# Patient Record
Sex: MALE | Race: WHITE | Employment: OTHER | URBAN - METROPOLITAN AREA
[De-identification: names, ages, dates, MRNs, and addresses within clinical notes are randomized per-mention and may not be internally consistent; named-entity substitution may affect disease eponyms.]

---

## 2023-12-23 ENCOUNTER — ANCILLARY PROCEDURE (OUTPATIENT)
Dept: EMERGENCY DEPT | Age: 73
DRG: 189 | End: 2023-12-23
Attending: EMERGENCY MEDICINE
Payer: MEDICARE

## 2023-12-23 ENCOUNTER — HOSPITAL ENCOUNTER (INPATIENT)
Age: 73
LOS: 4 days | Discharge: HOME | DRG: 189 | End: 2023-12-27
Attending: EMERGENCY MEDICINE | Admitting: INTERNAL MEDICINE
Payer: MEDICARE

## 2023-12-23 ENCOUNTER — APPOINTMENT (OUTPATIENT)
Dept: CT IMAGING | Age: 73
DRG: 189 | End: 2023-12-23
Payer: MEDICARE

## 2023-12-23 ENCOUNTER — APPOINTMENT (OUTPATIENT)
Dept: GENERAL RADIOLOGY | Age: 73
DRG: 189 | End: 2023-12-23
Payer: MEDICARE

## 2023-12-23 DIAGNOSIS — R06.89 DYSPNEA AND RESPIRATORY ABNORMALITIES: ICD-10-CM

## 2023-12-23 DIAGNOSIS — R06.00 DYSPNEA AND RESPIRATORY ABNORMALITIES: ICD-10-CM

## 2023-12-23 DIAGNOSIS — R09.02 HYPOXIA: Primary | ICD-10-CM

## 2023-12-23 DIAGNOSIS — E87.29 RESPIRATORY ACIDOSIS: ICD-10-CM

## 2023-12-23 PROBLEM — R73.9 HYPERGLYCEMIA: Status: ACTIVE | Noted: 2023-12-23

## 2023-12-23 PROBLEM — E87.1 HYPONATREMIA: Status: ACTIVE | Noted: 2023-12-23

## 2023-12-23 PROBLEM — J96.22 ACUTE ON CHRONIC RESPIRATORY FAILURE WITH HYPOXIA AND HYPERCAPNIA (HCC): Status: ACTIVE | Noted: 2023-12-23

## 2023-12-23 PROBLEM — J44.1 COPD EXACERBATION (HCC): Status: ACTIVE | Noted: 2023-12-23

## 2023-12-23 PROBLEM — J96.21 ACUTE ON CHRONIC RESPIRATORY FAILURE WITH HYPOXIA AND HYPERCAPNIA (HCC): Status: ACTIVE | Noted: 2023-12-23

## 2023-12-23 LAB
ALBUMIN SERPL-MCNC: 4.5 G/DL (ref 3.4–5)
ALBUMIN/GLOB SERPL: 1.4 {RATIO} (ref 1.1–2.2)
ALP SERPL-CCNC: 56 U/L (ref 40–129)
ALT SERPL-CCNC: 13 U/L (ref 10–40)
ANION GAP SERPL CALCULATED.3IONS-SCNC: 13 MMOL/L (ref 3–16)
AST SERPL-CCNC: 22 U/L (ref 15–37)
BASE EXCESS BLDA CALC-SCNC: -1.7 MMOL/L (ref -3–3)
BASE EXCESS BLDV CALC-SCNC: -4.4 MMOL/L (ref -3–3)
BASE EXCESS BLDV CALC-SCNC: -8.7 MMOL/L (ref -3–3)
BASOPHILS # BLD: 0 K/UL (ref 0–0.2)
BASOPHILS NFR BLD: 0.2 %
BILIRUB SERPL-MCNC: 0.7 MG/DL (ref 0–1)
BUN SERPL-MCNC: 8 MG/DL (ref 7–20)
CALCIUM SERPL-MCNC: 9.1 MG/DL (ref 8.3–10.6)
CHLORIDE SERPL-SCNC: 94 MMOL/L (ref 99–110)
CO2 BLDA-SCNC: 25.2 MMOL/L
CO2 BLDV-SCNC: 23 MMOL/L
CO2 BLDV-SCNC: 27 MMOL/L
CO2 SERPL-SCNC: 24 MMOL/L (ref 21–32)
COHGB MFR BLDA: 0.4 % (ref 0–1.5)
COHGB MFR BLDV: 1.1 % (ref 0–1.5)
COHGB MFR BLDV: 1.2 % (ref 0–1.5)
CREAT SERPL-MCNC: 0.7 MG/DL (ref 0.8–1.3)
DEPRECATED RDW RBC AUTO: 14.7 % (ref 12.4–15.4)
EKG ATRIAL RATE: 108 BPM
EKG DIAGNOSIS: NORMAL
EKG P AXIS: 95 DEGREES
EKG P-R INTERVAL: 152 MS
EKG Q-T INTERVAL: 324 MS
EKG QRS DURATION: 86 MS
EKG QTC CALCULATION (BAZETT): 434 MS
EKG R AXIS: 62 DEGREES
EKG T AXIS: 59 DEGREES
EKG VENTRICULAR RATE: 108 BPM
EOSINOPHIL # BLD: 0 K/UL (ref 0–0.6)
EOSINOPHIL NFR BLD: 0.1 %
FLUAV RNA RESP QL NAA+PROBE: NOT DETECTED
FLUBV RNA RESP QL NAA+PROBE: NOT DETECTED
GFR SERPLBLD CREATININE-BSD FMLA CKD-EPI: >60 ML/MIN/{1.73_M2}
GLUCOSE BLD-MCNC: 130 MG/DL (ref 70–99)
GLUCOSE BLD-MCNC: 133 MG/DL (ref 70–99)
GLUCOSE SERPL-MCNC: 202 MG/DL (ref 70–99)
HCO3 BLDA-SCNC: 23.9 MMOL/L (ref 21–29)
HCO3 BLDV-SCNC: 21.5 MMOL/L (ref 23–29)
HCO3 BLDV-SCNC: 25.1 MMOL/L (ref 23–29)
HCT VFR BLD AUTO: 41.9 % (ref 40.5–52.5)
HGB BLD-MCNC: 14.1 G/DL (ref 13.5–17.5)
HGB BLDA-MCNC: 14.8 G/DL (ref 13.5–17.5)
LYMPHOCYTES # BLD: 0.6 K/UL (ref 1–5.1)
LYMPHOCYTES NFR BLD: 3 %
MAGNESIUM SERPL-MCNC: 1.9 MG/DL (ref 1.8–2.4)
MCH RBC QN AUTO: 30.7 PG (ref 26–34)
MCHC RBC AUTO-ENTMCNC: 33.6 G/DL (ref 31–36)
MCV RBC AUTO: 91.4 FL (ref 80–100)
METHGB MFR BLDA: 0.1 %
METHGB MFR BLDV: 0.3 %
METHGB MFR BLDV: 0.3 %
MONOCYTES # BLD: 0.7 K/UL (ref 0–1.3)
MONOCYTES NFR BLD: 3.9 %
NEUTROPHILS # BLD: 17.1 K/UL (ref 1.7–7.7)
NEUTROPHILS NFR BLD: 92.8 %
NT-PROBNP SERPL-MCNC: 447 PG/ML (ref 0–124)
O2 CT VFR BLDV CALC: 16 VOL %
O2 CT VFR BLDV CALC: 18 VOL %
O2 THERAPY: ABNORMAL
O2 THERAPY: ABNORMAL
O2 THERAPY: NORMAL
PCO2 BLDA: 43.5 MMHG (ref 35–45)
PCO2 BLDV: 64.5 MMHG (ref 40–50)
PCO2 BLDV: 65.4 MMHG (ref 40–50)
PERFORMED ON: ABNORMAL
PERFORMED ON: ABNORMAL
PH BLDA: 7.36 [PH] (ref 7.35–7.45)
PH BLDV: 7.14 [PH] (ref 7.35–7.45)
PH BLDV: 7.2 [PH] (ref 7.35–7.45)
PLATELET # BLD AUTO: 326 K/UL (ref 135–450)
PMV BLD AUTO: 7.2 FL (ref 5–10.5)
PO2 BLDA: 106 MMHG (ref 75–108)
PO2 BLDV: 49.1 MMHG (ref 25–40)
PO2 BLDV: 58.6 MMHG (ref 25–40)
POTASSIUM SERPL-SCNC: 3.6 MMOL/L (ref 3.5–5.1)
PROCALCITONIN SERPL IA-MCNC: 0.13 NG/ML (ref 0–0.15)
PROT SERPL-MCNC: 7.7 G/DL (ref 6.4–8.2)
RBC # BLD AUTO: 4.58 M/UL (ref 4.2–5.9)
RSV AG NOSE QL: NEGATIVE
SAO2 % BLDA: 97.7 %
SAO2 % BLDV: 72 %
SAO2 % BLDV: 84 %
SARS-COV-2 RNA RESP QL NAA+PROBE: NOT DETECTED
SODIUM SERPL-SCNC: 131 MMOL/L (ref 136–145)
TROPONIN, HIGH SENSITIVITY: 26 NG/L (ref 0–22)
TROPONIN, HIGH SENSITIVITY: 34 NG/L (ref 0–22)
TROPONIN, HIGH SENSITIVITY: 35 NG/L (ref 0–22)
WBC # BLD AUTO: 18.4 K/UL (ref 4–11)

## 2023-12-23 PROCEDURE — 93308 TTE F-UP OR LMTD: CPT

## 2023-12-23 PROCEDURE — 94660 CPAP INITIATION&MGMT: CPT

## 2023-12-23 PROCEDURE — 2580000003 HC RX 258: Performed by: INTERNAL MEDICINE

## 2023-12-23 PROCEDURE — 93010 ELECTROCARDIOGRAM REPORT: CPT | Performed by: INTERNAL MEDICINE

## 2023-12-23 PROCEDURE — 6360000004 HC RX CONTRAST MEDICATION: Performed by: EMERGENCY MEDICINE

## 2023-12-23 PROCEDURE — 6360000002 HC RX W HCPCS: Performed by: NURSE PRACTITIONER

## 2023-12-23 PROCEDURE — 84145 PROCALCITONIN (PCT): CPT

## 2023-12-23 PROCEDURE — 83880 ASSAY OF NATRIURETIC PEPTIDE: CPT

## 2023-12-23 PROCEDURE — 80053 COMPREHEN METABOLIC PANEL: CPT

## 2023-12-23 PROCEDURE — 2580000003 HC RX 258: Performed by: NURSE PRACTITIONER

## 2023-12-23 PROCEDURE — 83735 ASSAY OF MAGNESIUM: CPT

## 2023-12-23 PROCEDURE — 99291 CRITICAL CARE FIRST HOUR: CPT | Performed by: INTERNAL MEDICINE

## 2023-12-23 PROCEDURE — 93005 ELECTROCARDIOGRAM TRACING: CPT | Performed by: EMERGENCY MEDICINE

## 2023-12-23 PROCEDURE — 87636 SARSCOV2 & INF A&B AMP PRB: CPT

## 2023-12-23 PROCEDURE — 71045 X-RAY EXAM CHEST 1 VIEW: CPT

## 2023-12-23 PROCEDURE — 2500000003 HC RX 250 WO HCPCS: Performed by: INTERNAL MEDICINE

## 2023-12-23 PROCEDURE — 5A09357 ASSISTANCE WITH RESPIRATORY VENTILATION, LESS THAN 24 CONSECUTIVE HOURS, CONTINUOUS POSITIVE AIRWAY PRESSURE: ICD-10-PCS | Performed by: INTERNAL MEDICINE

## 2023-12-23 PROCEDURE — 0202U NFCT DS 22 TRGT SARS-COV-2: CPT

## 2023-12-23 PROCEDURE — 2500000003 HC RX 250 WO HCPCS: Performed by: NURSE PRACTITIONER

## 2023-12-23 PROCEDURE — 99291 CRITICAL CARE FIRST HOUR: CPT

## 2023-12-23 PROCEDURE — 85025 COMPLETE CBC W/AUTO DIFF WBC: CPT

## 2023-12-23 PROCEDURE — 94640 AIRWAY INHALATION TREATMENT: CPT

## 2023-12-23 PROCEDURE — 94761 N-INVAS EAR/PLS OXIMETRY MLT: CPT

## 2023-12-23 PROCEDURE — 36415 COLL VENOUS BLD VENIPUNCTURE: CPT

## 2023-12-23 PROCEDURE — 2700000000 HC OXYGEN THERAPY PER DAY

## 2023-12-23 PROCEDURE — 83036 HEMOGLOBIN GLYCOSYLATED A1C: CPT

## 2023-12-23 PROCEDURE — 6360000002 HC RX W HCPCS: Performed by: INTERNAL MEDICINE

## 2023-12-23 PROCEDURE — 87807 RSV ASSAY W/OPTIC: CPT

## 2023-12-23 PROCEDURE — 71260 CT THORAX DX C+: CPT

## 2023-12-23 PROCEDURE — 84484 ASSAY OF TROPONIN QUANT: CPT

## 2023-12-23 PROCEDURE — 82803 BLOOD GASES ANY COMBINATION: CPT

## 2023-12-23 PROCEDURE — 99223 1ST HOSP IP/OBS HIGH 75: CPT | Performed by: INTERNAL MEDICINE

## 2023-12-23 PROCEDURE — 6370000000 HC RX 637 (ALT 250 FOR IP): Performed by: INTERNAL MEDICINE

## 2023-12-23 PROCEDURE — 6370000000 HC RX 637 (ALT 250 FOR IP): Performed by: EMERGENCY MEDICINE

## 2023-12-23 PROCEDURE — 2000000000 HC ICU R&B

## 2023-12-23 RX ORDER — SODIUM CHLORIDE 9 MG/ML
INJECTION, SOLUTION INTRAVENOUS PRN
Status: DISCONTINUED | OUTPATIENT
Start: 2023-12-23 | End: 2023-12-27 | Stop reason: HOSPADM

## 2023-12-23 RX ORDER — POTASSIUM CHLORIDE 7.45 MG/ML
10 INJECTION INTRAVENOUS PRN
Status: DISCONTINUED | OUTPATIENT
Start: 2023-12-23 | End: 2023-12-27 | Stop reason: HOSPADM

## 2023-12-23 RX ORDER — DEXMEDETOMIDINE HYDROCHLORIDE 4 UG/ML
.1-1.5 INJECTION, SOLUTION INTRAVENOUS CONTINUOUS
Status: DISCONTINUED | OUTPATIENT
Start: 2023-12-23 | End: 2023-12-24

## 2023-12-23 RX ORDER — INSULIN LISPRO 100 [IU]/ML
0-4 INJECTION, SOLUTION INTRAVENOUS; SUBCUTANEOUS NIGHTLY
Status: DISCONTINUED | OUTPATIENT
Start: 2023-12-23 | End: 2023-12-24

## 2023-12-23 RX ORDER — INSULIN LISPRO 100 [IU]/ML
0-4 INJECTION, SOLUTION INTRAVENOUS; SUBCUTANEOUS
Status: DISCONTINUED | OUTPATIENT
Start: 2023-12-23 | End: 2023-12-24

## 2023-12-23 RX ORDER — ACETAMINOPHEN 650 MG/1
650 SUPPOSITORY RECTAL EVERY 6 HOURS PRN
Status: DISCONTINUED | OUTPATIENT
Start: 2023-12-23 | End: 2023-12-27 | Stop reason: HOSPADM

## 2023-12-23 RX ORDER — POTASSIUM CHLORIDE 29.8 MG/ML
20 INJECTION INTRAVENOUS PRN
Status: DISCONTINUED | OUTPATIENT
Start: 2023-12-23 | End: 2023-12-23

## 2023-12-23 RX ORDER — ONDANSETRON 4 MG/1
4 TABLET, ORALLY DISINTEGRATING ORAL EVERY 8 HOURS PRN
Status: DISCONTINUED | OUTPATIENT
Start: 2023-12-23 | End: 2023-12-27 | Stop reason: HOSPADM

## 2023-12-23 RX ORDER — ENOXAPARIN SODIUM 100 MG/ML
40 INJECTION SUBCUTANEOUS DAILY
Status: DISCONTINUED | OUTPATIENT
Start: 2023-12-23 | End: 2023-12-23

## 2023-12-23 RX ORDER — ALBUTEROL SULFATE 2.5 MG/3ML
2.5 SOLUTION RESPIRATORY (INHALATION) EVERY 4 HOURS PRN
Status: DISCONTINUED | OUTPATIENT
Start: 2023-12-23 | End: 2023-12-27 | Stop reason: HOSPADM

## 2023-12-23 RX ORDER — ONDANSETRON 2 MG/ML
4 INJECTION INTRAMUSCULAR; INTRAVENOUS EVERY 6 HOURS PRN
Status: DISCONTINUED | OUTPATIENT
Start: 2023-12-23 | End: 2023-12-27 | Stop reason: HOSPADM

## 2023-12-23 RX ORDER — IPRATROPIUM BROMIDE AND ALBUTEROL SULFATE 2.5; .5 MG/3ML; MG/3ML
1 SOLUTION RESPIRATORY (INHALATION)
Status: DISCONTINUED | OUTPATIENT
Start: 2023-12-23 | End: 2023-12-27 | Stop reason: HOSPADM

## 2023-12-23 RX ORDER — MAGNESIUM SULFATE IN WATER 40 MG/ML
2000 INJECTION, SOLUTION INTRAVENOUS PRN
Status: DISCONTINUED | OUTPATIENT
Start: 2023-12-23 | End: 2023-12-27 | Stop reason: HOSPADM

## 2023-12-23 RX ORDER — SODIUM CHLORIDE 0.9 % (FLUSH) 0.9 %
5-40 SYRINGE (ML) INJECTION EVERY 12 HOURS SCHEDULED
Status: DISCONTINUED | OUTPATIENT
Start: 2023-12-23 | End: 2023-12-27 | Stop reason: HOSPADM

## 2023-12-23 RX ORDER — ACETAMINOPHEN 325 MG/1
650 TABLET ORAL EVERY 6 HOURS PRN
Status: DISCONTINUED | OUTPATIENT
Start: 2023-12-23 | End: 2023-12-27 | Stop reason: HOSPADM

## 2023-12-23 RX ORDER — IPRATROPIUM BROMIDE AND ALBUTEROL SULFATE 2.5; .5 MG/3ML; MG/3ML
1 SOLUTION RESPIRATORY (INHALATION)
Status: COMPLETED | OUTPATIENT
Start: 2023-12-23 | End: 2023-12-23

## 2023-12-23 RX ORDER — ENOXAPARIN SODIUM 100 MG/ML
40 INJECTION SUBCUTANEOUS NIGHTLY
Status: DISCONTINUED | OUTPATIENT
Start: 2023-12-23 | End: 2023-12-27 | Stop reason: HOSPADM

## 2023-12-23 RX ORDER — POLYETHYLENE GLYCOL 3350 17 G/17G
17 POWDER, FOR SOLUTION ORAL DAILY PRN
Status: DISCONTINUED | OUTPATIENT
Start: 2023-12-23 | End: 2023-12-27 | Stop reason: HOSPADM

## 2023-12-23 RX ORDER — NICOTINE 21 MG/24HR
1 PATCH, TRANSDERMAL 24 HOURS TRANSDERMAL DAILY
Status: DISCONTINUED | OUTPATIENT
Start: 2023-12-23 | End: 2023-12-23

## 2023-12-23 RX ORDER — SODIUM CHLORIDE 0.9 % (FLUSH) 0.9 %
5-40 SYRINGE (ML) INJECTION PRN
Status: DISCONTINUED | OUTPATIENT
Start: 2023-12-23 | End: 2023-12-27 | Stop reason: HOSPADM

## 2023-12-23 RX ADMIN — WATER 40 MG: 1 INJECTION INTRAMUSCULAR; INTRAVENOUS; SUBCUTANEOUS at 15:01

## 2023-12-23 RX ADMIN — ENOXAPARIN SODIUM 40 MG: 100 INJECTION SUBCUTANEOUS at 20:12

## 2023-12-23 RX ADMIN — DOXYCYCLINE 100 MG: 100 INJECTION, POWDER, LYOPHILIZED, FOR SOLUTION INTRAVENOUS at 15:22

## 2023-12-23 RX ADMIN — IPRATROPIUM BROMIDE AND ALBUTEROL SULFATE 1 DOSE: 2.5; .5 SOLUTION RESPIRATORY (INHALATION) at 15:17

## 2023-12-23 RX ADMIN — IPRATROPIUM BROMIDE AND ALBUTEROL SULFATE 1 DOSE: 2.5; .5 SOLUTION RESPIRATORY (INHALATION) at 09:43

## 2023-12-23 RX ADMIN — Medication 0.2 MCG/KG/HR: at 15:20

## 2023-12-23 RX ADMIN — SODIUM CHLORIDE, PRESERVATIVE FREE 10 ML: 5 INJECTION INTRAVENOUS at 20:33

## 2023-12-23 RX ADMIN — IPRATROPIUM BROMIDE AND ALBUTEROL SULFATE 1 DOSE: 2.5; .5 SOLUTION RESPIRATORY (INHALATION) at 20:16

## 2023-12-23 RX ADMIN — IOPAMIDOL 75 ML: 755 INJECTION, SOLUTION INTRAVENOUS at 11:17

## 2023-12-23 NOTE — CONSULTS
Reason for referral and CC: COPD, SOB, respiratory failure    HISTORY OF PRESENT ILLNESS: 70 yo male with a h/o tobacco abuse about 20 years ago and no known history of COPD presents with shortness of breath. He was found to be significantly hypoxic in the emergency room he was in respiratory distress. His CO2 was elevated and he was placed on BiPAP and admitted to the ICU. In ICU he is extremely anxious and was started on Precedex drip which is greatly helping him tolerate the BiPAP. CT ruled out PE and showed likely COPD. No past medical history on file. No past surgical history on file. Family History  family history is not on file. Social History:  has no history on file for tobacco use.   has no history on file for alcohol use. ALLERGIES:  Patient has No Known Allergies.   Continuous Infusions:   sodium chloride      dexmedetomidine HCl in NaCl 0.2 mcg/kg/hr (12/23/23 1520)     Scheduled Meds:   sodium chloride flush  5-40 mL IntraVENous 2 times per day    enoxaparin  40 mg SubCUTAneous Daily    doxycycline (VIBRAMYCIN) IV  100 mg IntraVENous 2 times per day    methylPREDNISolone  40 mg IntraVENous Daily    ipratropium 0.5 mg-albuterol 2.5 mg  1 Dose Inhalation 4x Daily RT    insulin lispro  0-4 Units SubCUTAneous TID WC    insulin lispro  0-4 Units SubCUTAneous Nightly     PRN Meds:  sodium chloride flush, sodium chloride, [DISCONTINUED] potassium chloride **OR** potassium chloride, magnesium sulfate, ondansetron **OR** ondansetron, polyethylene glycol, acetaminophen **OR** acetaminophen, perflutren lipid microspheres, albuterol    REVIEW OF SYSTEMS:  Constitutional: Negative for fever  HENT: Negative for sore throat  Eyes: Negative for redness   Respiratory:+ for dyspnea, cough  Cardiovascular: Negative for chest pain  Gastrointestinal: Negative for vomiting, diarrhea   Genitourinary: Negative for hematuria   Musculoskeletal: Negative for arthralgias   Skin: Negative for rash  Neurological:

## 2023-12-23 NOTE — ED NOTES
1235- Call to pulmonology for consult. Dr. Pena Fore to callback. 18- Dr. Pena Fore with callback and connected to Dr. Homa Llanos.

## 2023-12-24 LAB
ANION GAP SERPL CALCULATED.3IONS-SCNC: 10 MMOL/L (ref 3–16)
BASE EXCESS BLDV CALC-SCNC: 2.3 MMOL/L (ref -3–3)
BASOPHILS # BLD: 0 K/UL (ref 0–0.2)
BASOPHILS NFR BLD: 0.1 %
BUN SERPL-MCNC: 12 MG/DL (ref 7–20)
CALCIUM SERPL-MCNC: 9.5 MG/DL (ref 8.3–10.6)
CHLORIDE SERPL-SCNC: 101 MMOL/L (ref 99–110)
CO2 BLDV-SCNC: 29 MMOL/L
CO2 SERPL-SCNC: 24 MMOL/L (ref 21–32)
COHGB MFR BLDV: 1.8 % (ref 0–1.5)
CREAT SERPL-MCNC: 0.7 MG/DL (ref 0.8–1.3)
DEPRECATED RDW RBC AUTO: 14.7 % (ref 12.4–15.4)
EOSINOPHIL # BLD: 0.1 K/UL (ref 0–0.6)
EOSINOPHIL NFR BLD: 0.4 %
EST. AVERAGE GLUCOSE BLD GHB EST-MCNC: 96.8 MG/DL
GFR SERPLBLD CREATININE-BSD FMLA CKD-EPI: >60 ML/MIN/{1.73_M2}
GLUCOSE BLD-MCNC: 114 MG/DL (ref 70–99)
GLUCOSE BLD-MCNC: 97 MG/DL (ref 70–99)
GLUCOSE SERPL-MCNC: 108 MG/DL (ref 70–99)
HBA1C MFR BLD: 5 %
HCO3 BLDV-SCNC: 27.4 MMOL/L (ref 23–29)
HCT VFR BLD AUTO: 40.3 % (ref 40.5–52.5)
HGB BLD-MCNC: 13.4 G/DL (ref 13.5–17.5)
LYMPHOCYTES # BLD: 0.7 K/UL (ref 1–5.1)
LYMPHOCYTES NFR BLD: 4.3 %
MCH RBC QN AUTO: 30.1 PG (ref 26–34)
MCHC RBC AUTO-ENTMCNC: 33.3 G/DL (ref 31–36)
MCV RBC AUTO: 90.4 FL (ref 80–100)
METHGB MFR BLDV: 0.3 %
MONOCYTES # BLD: 1.4 K/UL (ref 0–1.3)
MONOCYTES NFR BLD: 8.2 %
NEUTROPHILS # BLD: 15.1 K/UL (ref 1.7–7.7)
NEUTROPHILS NFR BLD: 87 %
O2 CT VFR BLDV CALC: 12 VOL %
O2 THERAPY: ABNORMAL
PCO2 BLDV: 44.6 MMHG (ref 40–50)
PERFORMED ON: ABNORMAL
PERFORMED ON: NORMAL
PH BLDV: 7.41 [PH] (ref 7.35–7.45)
PLATELET # BLD AUTO: 289 K/UL (ref 135–450)
PMV BLD AUTO: 7.6 FL (ref 5–10.5)
PO2 BLDV: 27.9 MMHG (ref 25–40)
POTASSIUM SERPL-SCNC: 4.5 MMOL/L (ref 3.5–5.1)
RBC # BLD AUTO: 4.45 M/UL (ref 4.2–5.9)
REPORT: NORMAL
RESP PATH DNA+RNA PNL NPH NAA+NON-PROBE: NORMAL
SAO2 % BLDV: 52 %
SODIUM SERPL-SCNC: 135 MMOL/L (ref 136–145)
WBC # BLD AUTO: 17.3 K/UL (ref 4–11)

## 2023-12-24 PROCEDURE — 99291 CRITICAL CARE FIRST HOUR: CPT | Performed by: INTERNAL MEDICINE

## 2023-12-24 PROCEDURE — 99233 SBSQ HOSP IP/OBS HIGH 50: CPT | Performed by: INTERNAL MEDICINE

## 2023-12-24 PROCEDURE — 2580000003 HC RX 258: Performed by: INTERNAL MEDICINE

## 2023-12-24 PROCEDURE — 6370000000 HC RX 637 (ALT 250 FOR IP): Performed by: INTERNAL MEDICINE

## 2023-12-24 PROCEDURE — 94761 N-INVAS EAR/PLS OXIMETRY MLT: CPT

## 2023-12-24 PROCEDURE — 2580000003 HC RX 258: Performed by: NURSE PRACTITIONER

## 2023-12-24 PROCEDURE — 2500000003 HC RX 250 WO HCPCS: Performed by: NURSE PRACTITIONER

## 2023-12-24 PROCEDURE — 80048 BASIC METABOLIC PNL TOTAL CA: CPT

## 2023-12-24 PROCEDURE — 94640 AIRWAY INHALATION TREATMENT: CPT

## 2023-12-24 PROCEDURE — 94660 CPAP INITIATION&MGMT: CPT

## 2023-12-24 PROCEDURE — 85025 COMPLETE CBC W/AUTO DIFF WBC: CPT

## 2023-12-24 PROCEDURE — 6360000002 HC RX W HCPCS: Performed by: INTERNAL MEDICINE

## 2023-12-24 PROCEDURE — 36415 COLL VENOUS BLD VENIPUNCTURE: CPT

## 2023-12-24 PROCEDURE — 2700000000 HC OXYGEN THERAPY PER DAY

## 2023-12-24 PROCEDURE — 1200000000 HC SEMI PRIVATE

## 2023-12-24 PROCEDURE — 82803 BLOOD GASES ANY COMBINATION: CPT

## 2023-12-24 RX ADMIN — IPRATROPIUM BROMIDE AND ALBUTEROL SULFATE 1 DOSE: 2.5; .5 SOLUTION RESPIRATORY (INHALATION) at 07:35

## 2023-12-24 RX ADMIN — DOXYCYCLINE 100 MG: 100 INJECTION, POWDER, LYOPHILIZED, FOR SOLUTION INTRAVENOUS at 01:09

## 2023-12-24 RX ADMIN — DOXYCYCLINE 100 MG: 100 INJECTION, POWDER, LYOPHILIZED, FOR SOLUTION INTRAVENOUS at 14:15

## 2023-12-24 RX ADMIN — IPRATROPIUM BROMIDE AND ALBUTEROL SULFATE 1 DOSE: 2.5; .5 SOLUTION RESPIRATORY (INHALATION) at 19:49

## 2023-12-24 RX ADMIN — IPRATROPIUM BROMIDE AND ALBUTEROL SULFATE 1 DOSE: 2.5; .5 SOLUTION RESPIRATORY (INHALATION) at 11:07

## 2023-12-24 RX ADMIN — SODIUM CHLORIDE, PRESERVATIVE FREE 10 ML: 5 INJECTION INTRAVENOUS at 08:17

## 2023-12-24 RX ADMIN — SODIUM CHLORIDE, PRESERVATIVE FREE 10 ML: 5 INJECTION INTRAVENOUS at 20:05

## 2023-12-24 RX ADMIN — WATER 40 MG: 1 INJECTION INTRAMUSCULAR; INTRAVENOUS; SUBCUTANEOUS at 08:18

## 2023-12-24 RX ADMIN — ENOXAPARIN SODIUM 40 MG: 100 INJECTION SUBCUTANEOUS at 20:04

## 2023-12-24 NOTE — ACP (ADVANCE CARE PLANNING)
Advance Care Planning     Advance Care Planning Inpatient Note  [facility]    Today's Date: 12/24/2023  Unit: [facility] ICU    Received request from IDT Member. Upon review of chart and communication with care team, patient's decision making abilities are not in question. . Patient and Child/Children was/were present in the room during visit. Goals of ACP Conversation:  Discuss advance care planning documents    Health Care Decision Makers:       Primary Decision Maker: Toya Stover - [phone]  Summary:  Verified Healthcare Decision [facility]    Advance Care Planning Documents (Patient Wishes):  None     Assessment:  Patient short of breath with conversation. Son at bedside states he thinks his mother, Kaylee Spurling, wanted information of health care power of . Interventions:  Provided education on documents for clarity and greater understanding  Discussed and provided education on state decision maker hierarchy    Care Preferences Communicated:   No    Outcomes/Plan:  ACP Discussion: If patient wants to revisit documents while here, chaplains will be available. Patient given information/documents and contact numbers.  informed patient and son that patient is the only one that can complete documents as you can't complete them for someone else.   We reviewed that his spouse would be the legal medical decision maker if patient becomes unable to make medical decisions, followed by his three adult children    Electronically signed by Caro Munguia [facility] on 12/24/2023 at 10:32 AM

## 2023-12-24 NOTE — CARE COORDINATION
Case Management Assessment  Initial Evaluation    Date/Time of Evaluation: 12/24/2023 4:13 PM  Assessment Completed by: Radha Lyons    If patient is discharged prior to next notation, then this note serves as note for discharge by case management. Patient Name: Weston Child                   YOB: 1950  Diagnosis: Respiratory acidosis [E87.29]  Dyspnea and respiratory abnormalities [R06.00, R06.89]  Hypoxia [R09.02]  Acute on chronic respiratory failure with hypoxia and hypercapnia ([address]) [J96.21, J96.22]                   Date / Time: 12/23/2023  9:24 AM    Patient Admission Status: Inpatient   Readmission Risk (Low < 19, Mod (19-27), High > 27): Readmission Risk Score: 9.1    Current PCP: No primary care provider on file. PCP verified by CM? Yes (no PCP)    Chart Reviewed: Yes      History Provided by: Significant Other  Patient Orientation: Alert and Oriented    Patient Cognition: Alert    Hospitalization in the last 30 days (Readmission):  No    If yes, Readmission Assessment in  Navigator will be completed. Advance Directives:      Code Status: Full Code   Patient's Primary Decision Maker is: Legal Next of Kin    Primary Decision MakeAnibal Hallman Spouse - [phone]    Discharge Planning:    Patient lives with: Spouse/Significant Other Type of Home: House  Primary Care Giver: Self  Patient Support Systems include: Spouse/Significant Other   Current Financial resources: Medicare  Current community resources: None  Current services prior to admission: None            Current DME:              Type of Home Care services:  [place]    ADLS  Prior functional level: Independent in ADLs/IADLs  Current functional level: Independent in ADLs/IADLs    PT AM-PAC:   /24  OT AM-PAC:   /24    Family can provide assistance at DC: Yes  Would you like Case Management to discuss the discharge plan with any other family members/significant others, and if so, who?  Yes (wife)  Plans to Return to Present

## 2023-12-24 NOTE — PLAN OF CARE
Problem: Discharge Planning  Goal: Discharge to home or other facility with appropriate resources  Outcome: Progressing     Problem: Pain  Goal: Verbalizes/displays adequate comfort level or baseline comfort level  Outcome: Progressing  Flowsheets (Taken 12/24/2023 1600)  Verbalizes/displays adequate comfort level or baseline comfort level:   Encourage patient to monitor pain and request assistance   Assess pain using appropriate pain scale   Administer analgesics based on type and severity of pain and evaluate response   Implement non-pharmacological measures as appropriate and evaluate response   Consider cultural and social influences on pain and pain management   Notify Licensed Independent Practitioner if interventions unsuccessful or patient reports new pain     Problem: Skin/Tissue Integrity  Goal: Absence of new skin breakdown  Outcome: Progressing     Problem: ABCDS Injury Assessment  Goal: Absence of physical injury  Outcome: Progressing     Problem: Safety - Adult  Goal: Free from fall injury  Outcome: Progressing     Problem: Chronic Conditions and Co-morbidities  Goal: Patient's chronic conditions and co-morbidity symptoms are monitored and maintained or improved  Outcome: Progressing

## 2023-12-25 LAB
ANION GAP SERPL CALCULATED.3IONS-SCNC: 5 MMOL/L (ref 3–16)
ANISOCYTOSIS BLD QL SMEAR: ABNORMAL
BASOPHILS # BLD: 0 K/UL (ref 0–0.2)
BASOPHILS NFR BLD: 0 %
BUN SERPL-MCNC: 17 MG/DL (ref 7–20)
CALCIUM SERPL-MCNC: 8.8 MG/DL (ref 8.3–10.6)
CHLORIDE SERPL-SCNC: 103 MMOL/L (ref 99–110)
CO2 SERPL-SCNC: 28 MMOL/L (ref 21–32)
CREAT SERPL-MCNC: 0.8 MG/DL (ref 0.8–1.3)
DEPRECATED RDW RBC AUTO: 14.9 % (ref 12.4–15.4)
EOSINOPHIL # BLD: 0.3 K/UL (ref 0–0.6)
EOSINOPHIL NFR BLD: 2 %
GFR SERPLBLD CREATININE-BSD FMLA CKD-EPI: >60 ML/MIN/{1.73_M2}
GLUCOSE SERPL-MCNC: 100 MG/DL (ref 70–99)
HCT VFR BLD AUTO: 37.9 % (ref 40.5–52.5)
HGB BLD-MCNC: 12.9 G/DL (ref 13.5–17.5)
LYMPHOCYTES # BLD: 1.2 K/UL (ref 1–5.1)
LYMPHOCYTES NFR BLD: 8 %
MCH RBC QN AUTO: 30.3 PG (ref 26–34)
MCHC RBC AUTO-ENTMCNC: 34 G/DL (ref 31–36)
MCV RBC AUTO: 89.2 FL (ref 80–100)
MONOCYTES # BLD: 0.8 K/UL (ref 0–1.3)
MONOCYTES NFR BLD: 5 %
NEUTROPHILS # BLD: 12.8 K/UL (ref 1.7–7.7)
NEUTROPHILS NFR BLD: 85 %
NEUTS HYPERSEG BLD QL SMEAR: PRESENT
PATH INTERP BLD-IMP: YES
PLATELET # BLD AUTO: 278 K/UL (ref 135–450)
PLATELET BLD QL SMEAR: ADEQUATE
PMV BLD AUTO: 7.4 FL (ref 5–10.5)
POIKILOCYTOSIS BLD QL SMEAR: ABNORMAL
POTASSIUM SERPL-SCNC: 3.9 MMOL/L (ref 3.5–5.1)
RBC # BLD AUTO: 4.25 M/UL (ref 4.2–5.9)
SLIDE REVIEW: ABNORMAL
SODIUM SERPL-SCNC: 136 MMOL/L (ref 136–145)
WBC # BLD AUTO: 15.1 K/UL (ref 4–11)

## 2023-12-25 PROCEDURE — 2700000000 HC OXYGEN THERAPY PER DAY

## 2023-12-25 PROCEDURE — 99233 SBSQ HOSP IP/OBS HIGH 50: CPT | Performed by: INTERNAL MEDICINE

## 2023-12-25 PROCEDURE — 80048 BASIC METABOLIC PNL TOTAL CA: CPT

## 2023-12-25 PROCEDURE — 2500000003 HC RX 250 WO HCPCS: Performed by: INTERNAL MEDICINE

## 2023-12-25 PROCEDURE — 1200000000 HC SEMI PRIVATE

## 2023-12-25 PROCEDURE — 94640 AIRWAY INHALATION TREATMENT: CPT

## 2023-12-25 PROCEDURE — 85025 COMPLETE CBC W/AUTO DIFF WBC: CPT

## 2023-12-25 PROCEDURE — 99232 SBSQ HOSP IP/OBS MODERATE 35: CPT | Performed by: INTERNAL MEDICINE

## 2023-12-25 PROCEDURE — 94761 N-INVAS EAR/PLS OXIMETRY MLT: CPT

## 2023-12-25 PROCEDURE — 6370000000 HC RX 637 (ALT 250 FOR IP): Performed by: INTERNAL MEDICINE

## 2023-12-25 PROCEDURE — 36415 COLL VENOUS BLD VENIPUNCTURE: CPT

## 2023-12-25 PROCEDURE — 2580000003 HC RX 258: Performed by: INTERNAL MEDICINE

## 2023-12-25 PROCEDURE — 6360000002 HC RX W HCPCS: Performed by: INTERNAL MEDICINE

## 2023-12-25 RX ORDER — PREDNISONE 20 MG/1
40 TABLET ORAL DAILY
Status: DISCONTINUED | OUTPATIENT
Start: 2023-12-26 | End: 2023-12-27 | Stop reason: HOSPADM

## 2023-12-25 RX ADMIN — SODIUM CHLORIDE: 9 INJECTION, SOLUTION INTRAVENOUS at 02:43

## 2023-12-25 RX ADMIN — SODIUM CHLORIDE, PRESERVATIVE FREE 10 ML: 5 INJECTION INTRAVENOUS at 10:13

## 2023-12-25 RX ADMIN — WATER 40 MG: 1 INJECTION INTRAMUSCULAR; INTRAVENOUS; SUBCUTANEOUS at 10:14

## 2023-12-25 RX ADMIN — ENOXAPARIN SODIUM 40 MG: 100 INJECTION SUBCUTANEOUS at 20:57

## 2023-12-25 RX ADMIN — IPRATROPIUM BROMIDE AND ALBUTEROL SULFATE 1 DOSE: 2.5; .5 SOLUTION RESPIRATORY (INHALATION) at 07:48

## 2023-12-25 RX ADMIN — IPRATROPIUM BROMIDE AND ALBUTEROL SULFATE 1 DOSE: 2.5; .5 SOLUTION RESPIRATORY (INHALATION) at 15:36

## 2023-12-25 RX ADMIN — IPRATROPIUM BROMIDE AND ALBUTEROL SULFATE 1 DOSE: 2.5; .5 SOLUTION RESPIRATORY (INHALATION) at 11:08

## 2023-12-25 RX ADMIN — IPRATROPIUM BROMIDE AND ALBUTEROL SULFATE 1 DOSE: 2.5; .5 SOLUTION RESPIRATORY (INHALATION) at 19:45

## 2023-12-25 RX ADMIN — DOXYCYCLINE 100 MG: 100 INJECTION, POWDER, LYOPHILIZED, FOR SOLUTION INTRAVENOUS at 14:09

## 2023-12-25 RX ADMIN — SODIUM CHLORIDE, PRESERVATIVE FREE 10 ML: 5 INJECTION INTRAVENOUS at 20:59

## 2023-12-25 RX ADMIN — DOXYCYCLINE 100 MG: 100 INJECTION, POWDER, LYOPHILIZED, FOR SOLUTION INTRAVENOUS at 02:45

## 2023-12-25 NOTE — PLAN OF CARE
Problem: Discharge Planning  Goal: Discharge to home or other facility with appropriate resources  40/09/5935 7327 by LUIS FERNANDO VALLE  Outcome: Progressing  12/24/2023 1749 by Homero Gruber RN  Outcome: Progressing     Problem: Pain  Goal: Verbalizes/displays adequate comfort level or baseline comfort level  88/71/1308 8199 by LUIS FERNANDO VALLE  Outcome: Progressing  12/24/2023 1749 by Homero Gruber RN  Outcome: Progressing  Flowsheets (Taken 12/24/2023 1600)  Verbalizes/displays adequate comfort level or baseline comfort level:   Encourage patient to monitor pain and request assistance   Assess pain using appropriate pain scale   Administer analgesics based on type and severity of pain and evaluate response   Implement non-pharmacological measures as appropriate and evaluate response   Consider cultural and social influences on pain and pain management   Notify Licensed Independent Practitioner if interventions unsuccessful or patient reports new pain     Problem: Skin/Tissue Integrity  Goal: Absence of new skin breakdown  Description: 1. Monitor for areas of redness and/or skin breakdown  2. Assess vascular access sites hourly  3. Every 4-6 hours minimum:  Change oxygen saturation probe site  4. Every 4-6 hours:  If on nasal continuous positive airway pressure, respiratory therapy assess nares and determine need for appliance change or resting period.   08/52/6682 1546 by LUIS FERNANDO VALLE  Outcome: Progressing  12/24/2023 1749 by Homero Gruber RN  Outcome: Progressing     Problem: ABCDS Injury Assessment  Goal: Absence of physical injury  32/09/7690 8440 by LUIS FERNANDO VALLE  Outcome: Progressing  12/24/2023 1749 by Homero Gruber RN  Outcome: Progressing     Problem: Safety - Adult  Goal: Free from fall injury  92/64/5787 9417 by LUIS FERNANDO VALLE  Outcome: Progressing  12/24/2023 1749 by Homero Gruber RN  Outcome: Progressing     Problem: Chronic Conditions and Co-morbidities  Goal:

## 2023-12-25 NOTE — PLAN OF CARE
Problem: Discharge Planning  Goal: Discharge to home or other facility with appropriate resources  12/25/2023 1249 by Carley Brady RN  Outcome: Progressing  95/06/5932 9154 by LUIS FERNANDO VALLE  Outcome: Progressing     Problem: Pain  Goal: Verbalizes/displays adequate comfort level or baseline comfort level  12/25/2023 1249 by Carley Brady RN  Outcome: Progressing  10/70/9652 0681 by LUIS FERNANDO VALLE  Outcome: Progressing     Problem: Skin/Tissue Integrity  Goal: Absence of new skin breakdown  Description: 1. Monitor for areas of redness and/or skin breakdown  2. Assess vascular access sites hourly  3. Every 4-6 hours minimum:  Change oxygen saturation probe site  4. Every 4-6 hours:  If on nasal continuous positive airway pressure, respiratory therapy assess nares and determine need for appliance change or resting period.   12/25/2023 1249 by Carley Brady RN  Outcome: Progressing  93/30/3309 6565 by LUIS FERNANDO VALLE  Outcome: Progressing     Problem: ABCDS Injury Assessment  Goal: Absence of physical injury  12/25/2023 1249 by Carley Brady RN  Outcome: Progressing  29/22/7074 1012 by LUIS FERNANDO VALLE  Outcome: Progressing     Problem: Safety - Adult  Goal: Free from fall injury  55/70/4755 7983 by LUIS FERNANDO VALLE  Outcome: Progressing     Problem: Chronic Conditions and Co-morbidities  Goal: Patient's chronic conditions and co-morbidity symptoms are monitored and maintained or improved  84/22/5197 4519 by LUIS FERNANDO VALLE  Outcome: Progressing

## 2023-12-25 NOTE — PLAN OF CARE
Problem: Discharge Planning  Goal: Discharge to home or other facility with appropriate resources  12/25/2023 1249 by Maryse Youssef RN  Outcome: Progressing  12/25/2023 1249 by Maryse Youssef RN  Outcome: Progressing  81/94/9222 4178 by LUIS FERNANDO VALLE  Outcome: Progressing     Problem: Pain  Goal: Verbalizes/displays adequate comfort level or baseline comfort level  12/25/2023 1249 by Maryse Youssef RN  Outcome: Progressing  12/25/2023 1249 by Maryse Youssef RN  Outcome: Progressing  27/65/0604 5691 by LUIS FERNANDO VALLE  Outcome: Progressing     Problem: Skin/Tissue Integrity  Goal: Absence of new skin breakdown  Description: 1. Monitor for areas of redness and/or skin breakdown  2. Assess vascular access sites hourly  3. Every 4-6 hours minimum:  Change oxygen saturation probe site  4. Every 4-6 hours:  If on nasal continuous positive airway pressure, respiratory therapy assess nares and determine need for appliance change or resting period.   12/25/2023 1249 by Maryse Youssef RN  Outcome: Progressing  12/25/2023 1249 by Maryse Youssef RN  Outcome: Progressing  59/91/0175 0668 by LUIS FERNANDO VALLE  Outcome: Progressing     Problem: ABCDS Injury Assessment  Goal: Absence of physical injury  12/25/2023 1249 by Maryse Youssef RN  Outcome: Progressing  57/43/5942 8071 by LUIS FERNANDO VALLE  Outcome: Progressing     Problem: Safety - Adult  Goal: Free from fall injury  12/25/2023 1249 by Maryse Youssef RN  Outcome: Progressing  32/29/1835 8867 by LUIS FERNANDO VALLE  Outcome: Progressing     Problem: Chronic Conditions and Co-morbidities  Goal: Patient's chronic conditions and co-morbidity symptoms are monitored and maintained or improved  12/25/2023 1249 by Maryse Youssef RN  Outcome: Progressing  65/14/6984 2601 by LUIS FERNANDO VALLE  Outcome: Progressing

## 2023-12-26 PROBLEM — R09.02 HYPOXIA: Status: ACTIVE | Noted: 2023-12-26

## 2023-12-26 PROBLEM — E87.29 RESPIRATORY ACIDOSIS: Status: ACTIVE | Noted: 2023-12-26

## 2023-12-26 LAB
ANION GAP SERPL CALCULATED.3IONS-SCNC: 8 MMOL/L (ref 3–16)
BASOPHILS # BLD: 0 K/UL (ref 0–0.2)
BASOPHILS NFR BLD: 0.2 %
BUN SERPL-MCNC: 16 MG/DL (ref 7–20)
CALCIUM SERPL-MCNC: 8.5 MG/DL (ref 8.3–10.6)
CHLORIDE SERPL-SCNC: 104 MMOL/L (ref 99–110)
CO2 SERPL-SCNC: 26 MMOL/L (ref 21–32)
CREAT SERPL-MCNC: 0.7 MG/DL (ref 0.8–1.3)
DEPRECATED RDW RBC AUTO: 14.6 % (ref 12.4–15.4)
EOSINOPHIL # BLD: 0.1 K/UL (ref 0–0.6)
EOSINOPHIL NFR BLD: 0.8 %
GFR SERPLBLD CREATININE-BSD FMLA CKD-EPI: >60 ML/MIN/{1.73_M2}
GLUCOSE SERPL-MCNC: 101 MG/DL (ref 70–99)
HCT VFR BLD AUTO: 36.3 % (ref 40.5–52.5)
HGB BLD-MCNC: 12.4 G/DL (ref 13.5–17.5)
LYMPHOCYTES # BLD: 2.7 K/UL (ref 1–5.1)
LYMPHOCYTES NFR BLD: 20.8 %
MCH RBC QN AUTO: 30.3 PG (ref 26–34)
MCHC RBC AUTO-ENTMCNC: 34.1 G/DL (ref 31–36)
MCV RBC AUTO: 88.9 FL (ref 80–100)
MONOCYTES # BLD: 1.4 K/UL (ref 0–1.3)
MONOCYTES NFR BLD: 10.6 %
NEUTROPHILS # BLD: 8.8 K/UL (ref 1.7–7.7)
NEUTROPHILS NFR BLD: 67.6 %
PATH INTERP BLD-IMP: NORMAL
PLATELET # BLD AUTO: 279 K/UL (ref 135–450)
PMV BLD AUTO: 7.6 FL (ref 5–10.5)
POTASSIUM SERPL-SCNC: 3.9 MMOL/L (ref 3.5–5.1)
RBC # BLD AUTO: 4.08 M/UL (ref 4.2–5.9)
SODIUM SERPL-SCNC: 138 MMOL/L (ref 136–145)
WBC # BLD AUTO: 13.1 K/UL (ref 4–11)

## 2023-12-26 PROCEDURE — 6370000000 HC RX 637 (ALT 250 FOR IP): Performed by: INTERNAL MEDICINE

## 2023-12-26 PROCEDURE — 85025 COMPLETE CBC W/AUTO DIFF WBC: CPT

## 2023-12-26 PROCEDURE — 94010 BREATHING CAPACITY TEST: CPT

## 2023-12-26 PROCEDURE — 99232 SBSQ HOSP IP/OBS MODERATE 35: CPT

## 2023-12-26 PROCEDURE — 99232 SBSQ HOSP IP/OBS MODERATE 35: CPT | Performed by: INTERNAL MEDICINE

## 2023-12-26 PROCEDURE — 80048 BASIC METABOLIC PNL TOTAL CA: CPT

## 2023-12-26 PROCEDURE — 94761 N-INVAS EAR/PLS OXIMETRY MLT: CPT

## 2023-12-26 PROCEDURE — 93306 TTE W/DOPPLER COMPLETE: CPT

## 2023-12-26 PROCEDURE — 1200000000 HC SEMI PRIVATE

## 2023-12-26 PROCEDURE — 94640 AIRWAY INHALATION TREATMENT: CPT

## 2023-12-26 PROCEDURE — 2580000003 HC RX 258: Performed by: INTERNAL MEDICINE

## 2023-12-26 PROCEDURE — 2500000003 HC RX 250 WO HCPCS: Performed by: INTERNAL MEDICINE

## 2023-12-26 PROCEDURE — 36415 COLL VENOUS BLD VENIPUNCTURE: CPT

## 2023-12-26 PROCEDURE — 6360000002 HC RX W HCPCS: Performed by: INTERNAL MEDICINE

## 2023-12-26 RX ORDER — ALBUTEROL SULFATE 90 UG/1
2 AEROSOL, METERED RESPIRATORY (INHALATION) EVERY 4 HOURS PRN
Qty: 18 G | Refills: 0 | Status: SHIPPED | OUTPATIENT
Start: 2023-12-26

## 2023-12-26 RX ORDER — PREDNISONE 10 MG/1
TABLET ORAL
Qty: 14 TABLET | Refills: 0 | Status: SHIPPED | OUTPATIENT
Start: 2023-12-27 | End: 2024-01-02

## 2023-12-26 RX ORDER — DOXYCYCLINE HYCLATE 100 MG
100 TABLET ORAL 2 TIMES DAILY
Qty: 8 TABLET | Refills: 0 | Status: SHIPPED | OUTPATIENT
Start: 2023-12-26 | End: 2023-12-30

## 2023-12-26 RX ORDER — TIOTROPIUM BROMIDE INHALATION SPRAY 1.56 UG/1
2 SPRAY, METERED RESPIRATORY (INHALATION) DAILY
Qty: 1 EACH | Refills: 0 | Status: SHIPPED | OUTPATIENT
Start: 2023-12-26

## 2023-12-26 RX ADMIN — ENOXAPARIN SODIUM 40 MG: 100 INJECTION SUBCUTANEOUS at 21:32

## 2023-12-26 RX ADMIN — DOXYCYCLINE 100 MG: 100 INJECTION, POWDER, LYOPHILIZED, FOR SOLUTION INTRAVENOUS at 16:23

## 2023-12-26 RX ADMIN — IPRATROPIUM BROMIDE AND ALBUTEROL SULFATE 1 DOSE: 2.5; .5 SOLUTION RESPIRATORY (INHALATION) at 11:32

## 2023-12-26 RX ADMIN — IPRATROPIUM BROMIDE AND ALBUTEROL SULFATE 1 DOSE: 2.5; .5 SOLUTION RESPIRATORY (INHALATION) at 08:05

## 2023-12-26 RX ADMIN — SODIUM CHLORIDE, PRESERVATIVE FREE 10 ML: 5 INJECTION INTRAVENOUS at 21:33

## 2023-12-26 RX ADMIN — SODIUM CHLORIDE, PRESERVATIVE FREE 10 ML: 5 INJECTION INTRAVENOUS at 08:49

## 2023-12-26 RX ADMIN — DOXYCYCLINE 100 MG: 100 INJECTION, POWDER, LYOPHILIZED, FOR SOLUTION INTRAVENOUS at 02:03

## 2023-12-26 RX ADMIN — IPRATROPIUM BROMIDE AND ALBUTEROL SULFATE 1 DOSE: 2.5; .5 SOLUTION RESPIRATORY (INHALATION) at 19:38

## 2023-12-26 RX ADMIN — IPRATROPIUM BROMIDE AND ALBUTEROL SULFATE 1 DOSE: 2.5; .5 SOLUTION RESPIRATORY (INHALATION) at 14:54

## 2023-12-26 RX ADMIN — PREDNISONE 40 MG: 20 TABLET ORAL at 08:48

## 2023-12-26 NOTE — CARE COORDINATION
Reviewed chart, met with pt and wife. Pt ready for DC. Does not meet criteria for home oxygen. Plan for DC today home. Will continue to monitor.

## 2023-12-26 NOTE — DISCHARGE SUMMARY
Hospital Medicine Discharge Summary    Patient: Alicia Narayan     Gender: male  : 1950   Age: 68 y.o. MRN: [id number]    Admitting Physician: Nadja Tatum MD  Discharge Physician: Daniel Flores DO    Code Status: Full Code     Admit Date: 2023   Discharge Date: 2023      Discharge Diagnoses: Active Hospital Problems    Diagnosis Date Noted    Acute on chronic respiratory failure with hypoxia and hypercapnia (HCC) [J96.21, J96.22] 2023    COPD exacerbation ([address]) [J44.1] 2023    Hyperglycemia [R73.9] 2023    Hyponatremia [E87.1] 2023       Condition at Discharge: Stable    Hospital Course:     # Acute hypoxic respiratory failure. Patient came to the ER with shortness of breath. Workup consistent with acute respiratory failure. I suspect underlying COPD exacerbation. PE ruled out. Admitted to ICU. On BiPAP. Pulmonary consultation. Doing better. On 2 L.   - resolved, ambulating, pulse ox checked not needing oxygen     # Suspected COPD with acute exacerbation. Use COPD protocol. IV steroids and IV doxycycline. Tested negative for COVID and influenza. Respiratory panel negative.   -Prednisone taper and doxycycline     # Check echocardiogram.   -mild pulm HTN  -outpt follow-up     # on diet. # Lovenox for DVT prophylaxis. # Mild hyponatremia monitor sodium. # Hyperglycemia. Checked A1c. It is normal 5.0      Additional findings or notes to primary provider:  None at this time    Discharge Medications:   Current Discharge Medication List        START taking these medications    Details   doxycycline hyclate (VIBRA-TABS) 100 MG tablet Take 1 tablet by mouth 2 times daily for 4 days  Qty: 8 tablet, Refills: 0      predniSONE (DELTASONE) 10 MG tablet Take 4 tablets by mouth daily for 2 days, THEN 2 tablets daily for 2 days, THEN 1 tablet daily for 2 days.   Qty: 14 tablet, Refills: 0      tiotropium (SPIRIVA RESPIMAT) 1.25 MCG/ACT AERS inhaler

## 2023-12-26 NOTE — PLAN OF CARE
Problem: Discharge Planning  Goal: Discharge to home or other facility with appropriate resources  12/26/2023 0010 by Smiley Buckley RN  Outcome: Progressing  12/25/2023 1249 by Dontae Jin RN  Outcome: Progressing  12/25/2023 1249 by Dontae Jin RN  Outcome: Progressing  Flowsheets (Taken 12/25/2023 9297)  Discharge to home or other facility with appropriate resources: Identify barriers to discharge with patient and caregiver     Problem: Pain  Goal: Verbalizes/displays adequate comfort level or baseline comfort level  12/26/2023 0010 by Smiley Buckley RN  Outcome: Progressing  12/25/2023 1249 by Dontae Jin RN  Outcome: Progressing  12/25/2023 1249 by Dontae Jin RN  Outcome: Progressing     Problem: Skin/Tissue Integrity  Goal: Absence of new skin breakdown  Description: 1. Monitor for areas of redness and/or skin breakdown  2. Assess vascular access sites hourly  3. Every 4-6 hours minimum:  Change oxygen saturation probe site  4. Every 4-6 hours:  If on nasal continuous positive airway pressure, respiratory therapy assess nares and determine need for appliance change or resting period.   12/26/2023 0010 by Smiley Buckley RN  Outcome: Progressing  12/25/2023 1249 by Dontae Jin RN  Outcome: Progressing  12/25/2023 1249 by Dontae Jin RN  Outcome: Progressing     Problem: ABCDS Injury Assessment  Goal: Absence of physical injury  12/26/2023 0010 by Smiley Buckley RN  Outcome: Progressing  12/25/2023 1249 by Dontae Jin RN  Outcome: Progressing     Problem: Safety - Adult  Goal: Free from fall injury  12/26/2023 0010 by Smiley Buckley RN  Outcome: Progressing  12/25/2023 1249 by Dontae Jin RN  Outcome: Progressing     Problem: Chronic Conditions and Co-morbidities  Goal: Patient's chronic conditions and co-morbidity symptoms are monitored and maintained or improved  12/26/2023 0010 by Smiley Buckley RN  Outcome: Progressing  12/25/2023

## 2023-12-26 NOTE — PLAN OF CARE
Problem: Discharge Planning  Goal: Discharge to home or other facility with appropriate resources  12/26/2023 1049 by Juhi Vicente RN  Outcome: Progressing  Flowsheets (Taken 12/26/2023 6337)  Discharge to home or other facility with appropriate resources:   Identify barriers to discharge with patient and caregiver   Arrange for needed discharge resources and transportation as appropriate   Identify discharge learning needs (meds, wound care, etc)   Arrange for interpreters to assist at discharge as needed   Refer to discharge planning if patient needs post-hospital services based on physician order or complex needs related to functional status, cognitive ability or social support system  12/26/2023 0010 by Abraham Ascencio RN  Outcome: Progressing     Problem: Pain  Goal: Verbalizes/displays adequate comfort level or baseline comfort level  12/26/2023 1049 by Juhi Vicente RN  Outcome: Progressing  Flowsheets (Taken 12/26/2023 0830)  Verbalizes/displays adequate comfort level or baseline comfort level:   Encourage patient to monitor pain and request assistance   Assess pain using appropriate pain scale   Administer analgesics based on type and severity of pain and evaluate response   Implement non-pharmacological measures as appropriate and evaluate response   Consider cultural and social influences on pain and pain management   Notify Licensed Independent Practitioner if interventions unsuccessful or patient reports new pain  12/26/2023 0010 by Abraham Ascencio RN  Outcome: Progressing     Problem: Skin/Tissue Integrity  Goal: Absence of new skin breakdown  12/26/2023 1049 by Juhi Vicente RN  Outcome: Progressing  12/26/2023 0010 by Abraham Ascencio RN  Outcome: Progressing     Problem: ABCDS Injury Assessment  Goal: Absence of physical injury  12/26/2023 1049 by Juhi Vicente RN  Outcome: Progressing  12/26/2023 0010 by Abraham Ascencio RN  Outcome:

## 2023-12-27 VITALS
HEART RATE: 108 BPM | TEMPERATURE: 97.3 F | SYSTOLIC BLOOD PRESSURE: 145 MMHG | BODY MASS INDEX: 23.28 KG/M2 | WEIGHT: 157.19 LBS | HEIGHT: 69 IN | DIASTOLIC BLOOD PRESSURE: 84 MMHG | RESPIRATION RATE: 18 BRPM | OXYGEN SATURATION: 96 %

## 2023-12-27 PROCEDURE — 94761 N-INVAS EAR/PLS OXIMETRY MLT: CPT

## 2023-12-27 PROCEDURE — 2580000003 HC RX 258: Performed by: INTERNAL MEDICINE

## 2023-12-27 PROCEDURE — 6370000000 HC RX 637 (ALT 250 FOR IP): Performed by: INTERNAL MEDICINE

## 2023-12-27 PROCEDURE — 94640 AIRWAY INHALATION TREATMENT: CPT

## 2023-12-27 PROCEDURE — 2500000003 HC RX 250 WO HCPCS: Performed by: INTERNAL MEDICINE

## 2023-12-27 RX ADMIN — DOXYCYCLINE 100 MG: 100 INJECTION, POWDER, LYOPHILIZED, FOR SOLUTION INTRAVENOUS at 01:23

## 2023-12-27 RX ADMIN — IPRATROPIUM BROMIDE AND ALBUTEROL SULFATE 1 DOSE: 2.5; .5 SOLUTION RESPIRATORY (INHALATION) at 11:23

## 2023-12-27 RX ADMIN — PREDNISONE 40 MG: 20 TABLET ORAL at 09:31

## 2023-12-27 RX ADMIN — IPRATROPIUM BROMIDE AND ALBUTEROL SULFATE 1 DOSE: 2.5; .5 SOLUTION RESPIRATORY (INHALATION) at 08:21

## 2023-12-27 NOTE — CARE COORDINATION
DISCHARGE ORDER  Date/Time 2023 11:19 AM  Completed by: Palak Armstrong RN, Case Management    Patient Name: Lexie Villalobos      : 1950  Admitting Diagnosis: Respiratory acidosis [E87.29]  Dyspnea and respiratory abnormalities [R06.00, R06.89]  Hypoxia [R09.02]  Acute on chronic respiratory failure with hypoxia and hypercapnia ([address]) [I00.04, J96.22]      Admit order Date and Status: 2023  (verify MD's last order for status of admission)      Noted discharge order. If applicable PT/OT recommendation at Discharge: NA  DME recommendation by PT/OT: NA  Confirmed discharge plan   Discharge Plan: Chart reviewed. Met with pt and spouse at bedside and explained the role of the CM. Ambulating in room w/o assistive device/ gait steady. Will DC home with spouse today. No new HHC or DME needs. No DC barriers. Pt will tyake his paper scripts to the pharmacy of his choice. Date of Last IMM Given: 2023    Has Home O2 in place on admit:  No  Informed of need to bring portable home O2 tank on day of discharge for nursing to connect prior to leaving:   No  Verbalized agreement/Understanding:   No  Pt is being d/c'd to home today. Pt's O2 sats are 95% on RA. Discharge timeout done with [facility]. All discharge needs and concerns addressed.

## 2023-12-27 NOTE — PLAN OF CARE
Problem: Pain  Goal: Verbalizes/displays adequate comfort level or baseline comfort level  12/27/2023 0027 by Mike Zhao RN  Outcome: Progressing  Flowsheets (Taken 12/26/2023 1815 by Gisela Howe RN)  Verbalizes/displays adequate comfort level or baseline comfort level:   Encourage patient to monitor pain and request assistance   Assess pain using appropriate pain scale   Administer analgesics based on type and severity of pain and evaluate response   Implement non-pharmacological measures as appropriate and evaluate response   Consider cultural and social influences on pain and pain management   Notify Licensed Independent Practitioner if interventions unsuccessful or patient reports new pain  12/26/2023 1049 by Gisela Howe RN  Outcome: Progressing  Flowsheets (Taken 12/26/2023 0830)  Verbalizes/displays adequate comfort level or baseline comfort level:   Encourage patient to monitor pain and request assistance   Assess pain using appropriate pain scale   Administer analgesics based on type and severity of pain and evaluate response   Implement non-pharmacological measures as appropriate and evaluate response   Consider cultural and social influences on pain and pain management   Notify Licensed Independent Practitioner if interventions unsuccessful or patient reports new pain     Problem: Skin/Tissue Integrity  Goal: Absence of new skin breakdown  Description: 1. Monitor for areas of redness and/or skin breakdown  2. Assess vascular access sites hourly  3. Every 4-6 hours minimum:  Change oxygen saturation probe site  4. Every 4-6 hours:  If on nasal continuous positive airway pressure, respiratory therapy assess nares and determine need for appliance change or resting period.   12/27/2023 0027 by Mike Zhao RN  Outcome: Progressing  12/26/2023 1049 by Gisela Howe RN  Outcome: Progressing

## 2023-12-27 NOTE — CARE COORDINATION
12/27/23 1116   IMM Letter   IMM Letter given to Patient/Family/Significant other/Guardian/POA/by: Rosalba Hernández RN   IMM Letter date given: 12/27/23   IMM Letter time given: 200      CM delivered 2nd IMM delivered within 4 hours for DC, verbal explanation of patient rights at bedside. Pt voiced understanding of discharge MCR rights and is agreeable to discharge.

## 2023-12-27 NOTE — DISCHARGE INSTRUCTIONS
Your information:  Name: Libra Chu  : 1950    Your instructions: Follow up with PCP with in the week  Follow up with pulmonology  Maintain O2 above 93% at all times. Patient provided a pulse ox. What to do after you leave the hospital:    Recommended diet: regular diet    Recommended activity: activity as tolerated        The following personal items were collected during your admission and were returned to you:    Belongings  Dental Appliances: None  Vision - Corrective Lenses: Eyeglasses (at home)  Hearing Aid: None  Clothing: Undergarments  Jewelry: At home  Electronic Devices: None  Weapons (Notify Protective Services/Security): None  Home Medications: None  Valuables Given To: Family (Comment)  Provide Name(s) of Who Valuable(s) Were Given To: Rashid    Information obtained by:  By signing below, I understand that if any problems occur once I leave the hospital I am to contact MD.  I understand and acknowledge receipt of the instructions indicated above.

## 2023-12-27 NOTE — PLAN OF CARE
Problem: Discharge Planning  Goal: Discharge to home or other facility with appropriate resources  12/27/2023 2063 by Enoc Kay RN  Outcome: Progressing  12/27/2023 0027 by Javid Mar RN  Outcome: Progressing     Problem: Pain  Goal: Verbalizes/displays adequate comfort level or baseline comfort level  12/27/2023 0017 by Enoc Kay RN  Outcome: Progressing  12/27/2023 0027 by Javid Mar RN  Outcome: Progressing  Flowsheets (Taken 12/26/2023 1815 by Sofi Bangura RN)  Verbalizes/displays adequate comfort level or baseline comfort level:   Encourage patient to monitor pain and request assistance   Assess pain using appropriate pain scale   Administer analgesics based on type and severity of pain and evaluate response   Implement non-pharmacological measures as appropriate and evaluate response   Consider cultural and social influences on pain and pain management   Notify Licensed Independent Practitioner if interventions unsuccessful or patient reports new pain     Problem: Skin/Tissue Integrity  Goal: Absence of new skin breakdown  Description: 1. Monitor for areas of redness and/or skin breakdown  2. Assess vascular access sites hourly  3. Every 4-6 hours minimum:  Change oxygen saturation probe site  4. Every 4-6 hours:  If on nasal continuous positive airway pressure, respiratory therapy assess nares and determine need for appliance change or resting period.   12/27/2023 0538 by Enoc Kay RN  Outcome: Progressing  12/27/2023 0027 by Javid Mar RN  Outcome: Progressing

## 2023-12-27 NOTE — PLAN OF CARE
Problem: Discharge Planning  Goal: Discharge to home or other facility with appropriate resources  12/27/2023 7508 by Pattie Turk RN  Outcome: Progressing     Problem: Pain  Goal: Verbalizes/displays adequate comfort level or baseline comfort level  12/27/2023 1187 by Pattie Turk RN  Outcome: Progressing  12/27/2023 1876 by Pattie Turk RN  Outcome: Progressing  12/27/2023 0027 by Dustin Gan RN  Outcome: Progressing  Flowsheets (Taken 12/26/2023 1815 by Dandy Gramajo RN)  Verbalizes/displays adequate comfort level or baseline comfort level:   Encourage patient to monitor pain and request assistance   Assess pain using appropriate pain scale   Administer analgesics based on type and severity of pain and evaluate response   Implement non-pharmacological measures as appropriate and evaluate response   Consider cultural and social influences on pain and pain management   Notify Licensed Independent Practitioner if interventions unsuccessful or patient reports new pain

## 2023-12-28 DIAGNOSIS — J44.9 CHRONIC OBSTRUCTIVE PULMONARY DISEASE, UNSPECIFIED COPD TYPE (HCC): Primary | ICD-10-CM

## 2023-12-28 RX ORDER — NEBULIZER ACCESSORIES
1 KIT MISCELLANEOUS DAILY PRN
Qty: 1 KIT | Refills: 0 | Status: CANCELLED | OUTPATIENT
Start: 2023-12-28

## 2023-12-28 RX ORDER — ALBUTEROL SULFATE 2.5 MG/3ML
2.5 SOLUTION RESPIRATORY (INHALATION) EVERY 4 HOURS PRN
Qty: 1080 ML | Refills: 2 | Status: SHIPPED | OUTPATIENT
Start: 2023-12-28 | End: 2024-12-27

## 2023-12-28 NOTE — TELEPHONE ENCOUNTER
Per [facility] through the message routing basket      See me in hospital f/u in 1-3 weeks. Please also send order for nebulizer with albuterol neb to [facility] f/u with me in 1-2 weeks COPD. Will schedule f/u 3 month CT with IV dye with [facility] at that appt.    Please also pend nebulizer to go to [facility] with albuterol nebs     I believe this may be a duplicate but I can't see prior routing history, apologies

## 2023-12-28 NOTE — TELEPHONE ENCOUNTER
Please check orders for accuracy.      Spoke to Radha, pt spouse, to confirm appt for hospital f/u for 1/3/23 at [address] order to [facility], confirmation in media

## 2024-01-03 ENCOUNTER — OFFICE VISIT (OUTPATIENT)
Dept: PULMONOLOGY | Age: 74
End: 2024-01-03
Payer: MEDICARE

## 2024-01-03 VITALS
WEIGHT: 154 LBS | OXYGEN SATURATION: 95 % | HEART RATE: 94 BPM | BODY MASS INDEX: 23.34 KG/M2 | HEIGHT: 68 IN | RESPIRATION RATE: 18 BRPM | DIASTOLIC BLOOD PRESSURE: 70 MMHG | SYSTOLIC BLOOD PRESSURE: 132 MMHG

## 2024-01-03 DIAGNOSIS — J43.2 CENTRILOBULAR EMPHYSEMA (HCC): Primary | ICD-10-CM

## 2024-01-03 DIAGNOSIS — R59.1 LAD (LYMPHADENOPATHY): ICD-10-CM

## 2024-01-03 PROBLEM — J44.1 COPD EXACERBATION (HCC): Status: RESOLVED | Noted: 2023-12-23 | Resolved: 2024-01-03

## 2024-01-03 PROBLEM — E87.29 RESPIRATORY ACIDOSIS: Status: RESOLVED | Noted: 2023-12-26 | Resolved: 2024-01-03

## 2024-01-03 PROBLEM — J96.21 ACUTE ON CHRONIC RESPIRATORY FAILURE WITH HYPOXIA AND HYPERCAPNIA (HCC): Status: RESOLVED | Noted: 2023-12-23 | Resolved: 2024-01-03

## 2024-01-03 PROBLEM — R09.02 HYPOXIA: Status: RESOLVED | Noted: 2023-12-26 | Resolved: 2024-01-03

## 2024-01-03 PROBLEM — J96.22 ACUTE ON CHRONIC RESPIRATORY FAILURE WITH HYPOXIA AND HYPERCAPNIA (HCC): Status: RESOLVED | Noted: 2023-12-23 | Resolved: 2024-01-03

## 2024-01-03 PROCEDURE — 1123F ACP DISCUSS/DSCN MKR DOCD: CPT

## 2024-01-03 PROCEDURE — G8420 CALC BMI NORM PARAMETERS: HCPCS

## 2024-01-03 PROCEDURE — 3023F SPIROM DOC REV: CPT

## 2024-01-03 PROCEDURE — 3017F COLORECTAL CA SCREEN DOC REV: CPT

## 2024-01-03 PROCEDURE — 1036F TOBACCO NON-USER: CPT

## 2024-01-03 PROCEDURE — 99214 OFFICE O/P EST MOD 30 MIN: CPT

## 2024-01-03 PROCEDURE — G8484 FLU IMMUNIZE NO ADMIN: HCPCS

## 2024-01-03 PROCEDURE — G8427 DOCREV CUR MEDS BY ELIG CLIN: HCPCS

## 2024-01-03 PROCEDURE — 1111F DSCHRG MED/CURRENT MED MERGE: CPT

## 2024-01-03 RX ORDER — FLUTICASONE FUROATE, UMECLIDINIUM BROMIDE AND VILANTEROL TRIFENATATE 100; 62.5; 25 UG/1; UG/1; UG/1
1 POWDER RESPIRATORY (INHALATION) DAILY
Qty: 1 EACH | Refills: 2 | Status: SHIPPED | OUTPATIENT
Start: 2024-01-03

## 2024-01-03 NOTE — PATIENT INSTRUCTIONS
Trelegy is 1 steady inhalation over 1-2 seconds once every morning.  Rinse mouth and spit after use.   It has 3 medicines in it, including Spiriva.  So do not use Spiriva the same day you take Trelegy.     You are welcome to alternate Spiriva and Trelegy to see which one provides more benefit.     Neither of these replace albuterol, your rescue medicine.  This comes in the puffer form and the nebulizer form.  You are welcome to use either one on an as-needed basis for shortness of breath.

## 2024-01-03 NOTE — PROGRESS NOTES
PULMONARY, CRITICAL CARE AND SLEEP MEDICINE  Outpatient Pulmonary Progress Note   CC:  Shortness of breath  Referring Provider:  Hospital f/u      Interval History 1/3/23:  [facility] f/u   -  Doing well since home from hospital.  Finished Prednisone taper yesterday.  Was started on Spiriva. Using albuterol 1-2 x per day with benefit.  Received nebulizer.    -  SOB had become more of a problem the last 5 years but particularly worse the past year, no longer able to even mow grass any more.  He is relieved to have a dx for his sxs and be established with pulm.     [facility] 12/23/23 Dr. Lopez:  71 yo male with a h/o tobacco abuse about 20 years ago and no known history of COPD presents with shortness of breath.  He was found to be significantly hypoxic in the emergency room he was in respiratory distress.  His CO2 was elevated and he was placed on BiPAP and admitted to the ICU.  In ICU he is extremely anxious and was started on Precedex drip which is greatly helping him tolerate the BiPAP.  CT ruled out PE and showed likely COPD.    Was weaned off oxygen, did well off BiPAP, and discharged on Prednisone taper, Doxycycline & albuterol.  Had been sick with URI prior to the respiratory distress developing.      reports that he quit smoking about 26 years ago. His smoking use included cigarettes. He quit smokeless tobacco use about 23 years ago.  His smokeless tobacco use included chew.    PHYSICAL EXAM:  Blood pressure 132/70, pulse 94, resp. rate 18, height 1.727 m (5' 8\"), weight 69.9 kg (154 lb), SpO2 95 %.' on RA  154# Jan 2024;   Constitutional:  No acute distress.  Pleasant.   HENT:  Oropharynx is clear and moist.   Eyes:  Pupils equal, round, and reactive to light. No scleral icterus.   Neck:  No tracheal deviation present.    Cardiovascular:  Normal heart sounds.  No lower extremity edema.  Pulmonary/Chest:  No wheezes. No rhonchi. No rales. Mildly decreased breath sounds. No accessory muscle usage.

## 2024-01-08 ENCOUNTER — OFFICE VISIT (OUTPATIENT)
Dept: INTERNAL MEDICINE CLINIC | Age: 74
End: 2024-01-08

## 2024-01-08 VITALS
HEIGHT: 68 IN | SYSTOLIC BLOOD PRESSURE: 162 MMHG | BODY MASS INDEX: 23.34 KG/M2 | OXYGEN SATURATION: 98 % | DIASTOLIC BLOOD PRESSURE: 98 MMHG | HEART RATE: 83 BPM | RESPIRATION RATE: 16 BRPM | WEIGHT: 154 LBS

## 2024-01-08 DIAGNOSIS — Z23 NEED FOR TDAP VACCINATION: ICD-10-CM

## 2024-01-08 DIAGNOSIS — J43.9 PULMONARY EMPHYSEMA, UNSPECIFIED EMPHYSEMA TYPE (HCC): Primary | ICD-10-CM

## 2024-01-08 DIAGNOSIS — R03.0 ELEVATED BP WITHOUT DIAGNOSIS OF HYPERTENSION: ICD-10-CM

## 2024-01-08 PROCEDURE — 99214 OFFICE O/P EST MOD 30 MIN: CPT | Performed by: NURSE PRACTITIONER

## 2024-01-08 PROCEDURE — 1036F TOBACCO NON-USER: CPT | Performed by: NURSE PRACTITIONER

## 2024-01-08 PROCEDURE — G8427 DOCREV CUR MEDS BY ELIG CLIN: HCPCS | Performed by: NURSE PRACTITIONER

## 2024-01-08 PROCEDURE — 1123F ACP DISCUSS/DSCN MKR DOCD: CPT | Performed by: NURSE PRACTITIONER

## 2024-01-08 PROCEDURE — G8484 FLU IMMUNIZE NO ADMIN: HCPCS | Performed by: NURSE PRACTITIONER

## 2024-01-08 PROCEDURE — 3017F COLORECTAL CA SCREEN DOC REV: CPT | Performed by: NURSE PRACTITIONER

## 2024-01-08 PROCEDURE — G8420 CALC BMI NORM PARAMETERS: HCPCS | Performed by: NURSE PRACTITIONER

## 2024-01-08 PROCEDURE — 1111F DSCHRG MED/CURRENT MED MERGE: CPT | Performed by: NURSE PRACTITIONER

## 2024-01-08 ASSESSMENT — PATIENT HEALTH QUESTIONNAIRE - PHQ9
1. LITTLE INTEREST OR PLEASURE IN DOING THINGS: 1
SUM OF ALL RESPONSES TO PHQ QUESTIONS 1-9: 2
SUM OF ALL RESPONSES TO PHQ9 QUESTIONS 1 & 2: 2
2. FEELING DOWN, DEPRESSED OR HOPELESS: 1
SUM OF ALL RESPONSES TO PHQ QUESTIONS 1-9: 2

## 2024-01-08 ASSESSMENT — ENCOUNTER SYMPTOMS
ABDOMINAL PAIN: 0
SHORTNESS OF BREATH: 1
NAUSEA: 0
DIARRHEA: 0

## 2024-01-08 NOTE — PROGRESS NOTES
Chief Complaint:   Urban Godoy is a 73 y.o. male who presents for   Chief Complaint   Patient presents with    Establish Care       HPI    Patient presents to [facility] care as a new patient.     COPD  -F/w Dr. Whitaker.   -on Trelegy, Spiriva  -albuterol prn  -having PFT's and CT scan.     Admitted to [facility] 12/23-12/27/23 for COPD exacerbation  Initially in ICU on BiPAP.     Review of Systems  Review of Systems   Respiratory:  Positive for shortness of breath (at baseline; improved).    Cardiovascular:  Negative for chest pain.   Gastrointestinal:  Negative for abdominal pain, diarrhea and nausea.   Genitourinary:  Negative for difficulty urinating.         Allergies  Patient has no known allergies.      Vitals  BP (!) 162/98 (Site: Left Upper Arm, Position: Sitting)   Pulse 83   Resp 16   Ht 1.727 m (5' 8\")   Wt 69.9 kg (154 lb)   SpO2 98%   BMI 23.42 kg/m²     Current Medications  Current Outpatient Medications   Medication Sig Dispense Refill    fluticasone-umeclidin-vilant (TRELEGY ELLIPTA) 100-62.5-25 MCG/ACT AEPB inhaler Inhale 1 puff into the lungs daily Rinse mouth after use 1 each 2    albuterol (PROVENTIL) (2.5 MG/3ML) 0.083% nebulizer solution Take 3 mLs by nebulization every 4 hours as needed for Wheezing or Shortness of Breath 1080 mL 2    tiotropium (SPIRIVA RESPIMAT) 1.25 MCG/ACT AERS inhaler Inhale 2 puffs into the lungs daily 1 each 0    albuterol sulfate HFA (VENTOLIN HFA) 108 (90 Base) MCG/ACT inhaler Inhale 2 puffs into the lungs every 4 hours as needed for Wheezing 18 g 0    Spacer/Aero-Holding Chambers SOO 1 Device by Does not apply route daily 1 each 0     No current facility-administered medications for this visit.       Past Medical History  Past Medical History:   Diagnosis Date    RSD (reflex sympathetic dystrophy)        Social History  Social History     Socioeconomic History    Marital status:      Spouse name: Not on file    Number of children: Not on file    Years of

## 2024-02-05 DIAGNOSIS — J43.2 CENTRILOBULAR EMPHYSEMA (HCC): ICD-10-CM

## 2024-02-05 RX ORDER — FLUTICASONE FUROATE, UMECLIDINIUM BROMIDE AND VILANTEROL TRIFENATATE 100; 62.5; 25 UG/1; UG/1; UG/1
1 POWDER RESPIRATORY (INHALATION) DAILY
Qty: 1 EACH | Refills: 2 | Status: SHIPPED | OUTPATIENT
Start: 2024-02-05

## 2024-02-05 NOTE — TELEPHONE ENCOUNTER
Refill Request     CONFIRM preferrred pharmacy with the patient.    If Mail Order Rx - Pend for 90 day refill.      Last Seen: Last Seen Department: 1/3/2024  Last Seen by PCP: 1/3/2024    Last Written: 01/03/24    If no future appointment scheduled, route STAFF MESSAGE with patient name to the  Pool for scheduling.      Next Appointment:   Future Appointments   Date Time Provider Department Center   3/28/2024  2:00 PM [facility]MONARY FUN[facility]   3/28/2024  3:00 PM [facility]   4/2/2024  1:30 PM Salo Whitaker MD CLERM PULM [facility]       Message sent to  to schedule appt with patient?  NO      Requested Prescriptions      No prescriptions requested or ordered in this encounter

## 2024-02-19 RX ORDER — ALBUTEROL SULFATE 90 UG/1
2 AEROSOL, METERED RESPIRATORY (INHALATION) EVERY 4 HOURS PRN
Qty: 8.5 G | Refills: 0 | Status: SHIPPED | OUTPATIENT
Start: 2024-02-19

## 2024-03-28 ENCOUNTER — HOSPITAL ENCOUNTER (OUTPATIENT)
Dept: PULMONOLOGY | Age: 74
Discharge: HOME | End: 2024-03-28
Payer: MEDICARE

## 2024-03-28 ENCOUNTER — HOSPITAL ENCOUNTER (OUTPATIENT)
Dept: CT IMAGING | Age: 74
Discharge: HOME | End: 2024-03-28
Payer: MEDICARE

## 2024-03-28 VITALS — OXYGEN SATURATION: 98 %

## 2024-03-28 DIAGNOSIS — J43.2 CENTRILOBULAR EMPHYSEMA (HCC): ICD-10-CM

## 2024-03-28 DIAGNOSIS — R59.1 LAD (LYMPHADENOPATHY): ICD-10-CM

## 2024-03-28 LAB
DLCO %PRED: 65 %
DLCO PRED: NORMAL
DLCO/VA %PRED: NORMAL
DLCO/VA PRED: NORMAL
DLCO/VA: NORMAL
DLCO: NORMAL
EXPIRATORY TIME-POST: NORMAL
EXPIRATORY TIME: NORMAL
FEF 25-75 %CHNG: NORMAL
FEF 25-75 POST %PRED: NORMAL
FEF 25-75% %PRED-PRE: NORMAL
FEF 25-75% PRED: NORMAL
FEF 25-75-POST: NORMAL
FEF 25-75-PRE: NORMAL
FEV1 %PRED-POST: 53 %
FEV1 %PRED-PRE: 47 %
FEV1 PRED: NORMAL
FEV1-POST: NORMAL
FEV1-PRE: NORMAL
FEV1/FVC %PRED-POST: NORMAL
FEV1/FVC %PRED-PRE: NORMAL
FEV1/FVC PRED: NORMAL
FEV1/FVC-POST: 43 %
FEV1/FVC-PRE: 38 %
FVC %PRED-POST: NORMAL
FVC %PRED-PRE: NORMAL
FVC PRED: NORMAL
FVC-POST: NORMAL
FVC-PRE: NORMAL
GAW %PRED: NORMAL
GAW PRED: NORMAL
GAW: NORMAL
IC PRE %PRED: NORMAL
IC PRED: NORMAL
IC: NORMAL
MEP: NORMAL
MIP: NORMAL
MVV %PRED-PRE: NORMAL
MVV PRED: NORMAL
MVV-PRE: NORMAL
PEF %PRED-POST: NORMAL
PEF %PRED-PRE: NORMAL
PEF PRED: NORMAL
PEF%CHNG: NORMAL
PEF-POST: NORMAL
PEF-PRE: NORMAL
RAW %PRED: NORMAL
RAW PRED: NORMAL
RAW: NORMAL
RV PRE %PRED: NORMAL
RV PRED: NORMAL
RV: NORMAL
SVC %PRED: NORMAL
SVC PRED: NORMAL
SVC: NORMAL
TLC PRE %PRED: 115 %
TLC PRED: NORMAL
TLC: NORMAL
VA %PRED: NORMAL
VA PRED: NORMAL
VA: NORMAL
VTG %PRED: NORMAL
VTG PRED: NORMAL
VTG: NORMAL

## 2024-03-28 PROCEDURE — 94726 PLETHYSMOGRAPHY LUNG VOLUMES: CPT

## 2024-03-28 PROCEDURE — 6370000000 HC RX 637 (ALT 250 FOR IP)

## 2024-03-28 PROCEDURE — 94060 EVALUATION OF WHEEZING: CPT

## 2024-03-28 PROCEDURE — 94640 AIRWAY INHALATION TREATMENT: CPT

## 2024-03-28 PROCEDURE — 94729 DIFFUSING CAPACITY: CPT

## 2024-03-28 PROCEDURE — 94760 N-INVAS EAR/PLS OXIMETRY 1: CPT

## 2024-03-28 PROCEDURE — 6360000004 HC RX CONTRAST MEDICATION

## 2024-03-28 PROCEDURE — 71260 CT THORAX DX C+: CPT

## 2024-03-28 RX ORDER — ALBUTEROL SULFATE 90 UG/1
4 AEROSOL, METERED RESPIRATORY (INHALATION) ONCE
Status: COMPLETED | OUTPATIENT
Start: 2024-03-28 | End: 2024-03-28

## 2024-03-28 RX ADMIN — IOPAMIDOL 75 ML: 755 INJECTION, SOLUTION INTRAVENOUS at 14:31

## 2024-03-28 RX ADMIN — Medication 4 PUFF: at 14:11

## 2024-03-28 ASSESSMENT — PULMONARY FUNCTION TESTS
FEV1/FVC_POST: 43
FEV1/FVC_PRE: 38
FEV1_PERCENT_PREDICTED_POST: 53
FEV1_PERCENT_PREDICTED_PRE: 47

## 2024-03-30 NOTE — PROCEDURES
[address] [address]                           PULMONARY FUNCTION      PATIENT NAME: DERRICK NAVARRETE               : 1950  MED REC NO: [id number]                      ROOM:   ACCOUNT NO: [id number]                       ADMIT DATE: 2024  PROVIDER: Salo Ruiz MD      DATE OF PROCEDURE:  2024    STUDY:  PFT.    INDICATION:  Emphysema.    FINDINGS:    1. Spirometry:  The FEV1 is 1.38 L, which is 47% predicted.  The FEV1-FVC ratio is reduced.  Inhaled bronchodilators are given.  There is no significant improvement.  2. Lung volumes:  Total lung capacity is normal at 7.67 L or 115% predicted.  Air trapping is present.  3. Diffusing capacity:  DLCO is 19.57 mL/minute per mmHg which is 65% predicted.  4. Flow volume loop shows an obstructive pattern.    IMPRESSION:  A severe obstructive lung defect is present along with air trapping and a mild-to-moderate reduction in diffusing capacity.  This is consistent with the given diagnosis of emphysema.          SALO RUIZ MD      D:  2024 15:58:43     T:  2024 09:07:38     DCB/AQS  Job #:  [id number]     Doc#:  [id number]

## 2024-04-02 ENCOUNTER — OFFICE VISIT (OUTPATIENT)
Dept: PULMONOLOGY | Age: 74
End: 2024-04-02
Payer: MEDICARE

## 2024-04-02 VITALS
HEIGHT: 68 IN | RESPIRATION RATE: 12 BRPM | DIASTOLIC BLOOD PRESSURE: 76 MMHG | SYSTOLIC BLOOD PRESSURE: 126 MMHG | OXYGEN SATURATION: 95 % | HEART RATE: 84 BPM | BODY MASS INDEX: 23.98 KG/M2 | WEIGHT: 158.2 LBS

## 2024-04-02 DIAGNOSIS — J44.9 CHRONIC OBSTRUCTIVE PULMONARY DISEASE, UNSPECIFIED COPD TYPE (HCC): Primary | ICD-10-CM

## 2024-04-02 PROCEDURE — 1123F ACP DISCUSS/DSCN MKR DOCD: CPT | Performed by: INTERNAL MEDICINE

## 2024-04-02 PROCEDURE — 3017F COLORECTAL CA SCREEN DOC REV: CPT | Performed by: INTERNAL MEDICINE

## 2024-04-02 PROCEDURE — 99214 OFFICE O/P EST MOD 30 MIN: CPT | Performed by: INTERNAL MEDICINE

## 2024-04-02 PROCEDURE — G8420 CALC BMI NORM PARAMETERS: HCPCS | Performed by: INTERNAL MEDICINE

## 2024-04-02 PROCEDURE — 3023F SPIROM DOC REV: CPT | Performed by: INTERNAL MEDICINE

## 2024-04-02 PROCEDURE — 1036F TOBACCO NON-USER: CPT | Performed by: INTERNAL MEDICINE

## 2024-04-02 PROCEDURE — G8427 DOCREV CUR MEDS BY ELIG CLIN: HCPCS | Performed by: INTERNAL MEDICINE

## 2024-04-02 RX ORDER — FLUTICASONE PROPIONATE AND SALMETEROL 250; 50 UG/1; UG/1
1 POWDER RESPIRATORY (INHALATION) EVERY 12 HOURS
Qty: 60 EACH | Refills: 5 | Status: SHIPPED | OUTPATIENT
Start: 2024-04-02 | End: 2024-07-31

## 2024-04-02 RX ORDER — FLUTICASONE FUROATE, UMECLIDINIUM BROMIDE AND VILANTEROL TRIFENATATE 100; 62.5; 25 UG/1; UG/1; UG/1
1 POWDER RESPIRATORY (INHALATION) DAILY
Qty: 1 EACH | Refills: 0 | COMMUNITY
Start: 2024-04-02

## 2024-04-02 NOTE — PROGRESS NOTES
PULMONARY, CRITICAL CARE AND SLEEP MEDICINE  Outpatient Pulmonary Progress Note   CC:  Shortness of breath  Referring Provider:  Hospital f/u      Interval History 4/2/24  - doing pretty well.  On trelegy but cost is $500.  Doesn't notice big difference on days used and days not used.  However, his wheezing is no longer present.         Interval History 1/3/23:  [facility] f/u   -  Doing well since home from hospital.  Finished Prednisone taper yesterday.  Was started on Spiriva. Using albuterol 1-2 x per day with benefit.  Received nebulizer.    -  SOB had become more of a problem the last 5 years but particularly worse the past year, no longer able to even mow grass any more.  He is relieved to have a dx for his sxs and be established with pulm.     [facility] 12/23/23 Dr. Lopez:  71 yo male with a h/o tobacco abuse about 20 years ago and no known history of COPD presents with shortness of breath.  He was found to be significantly hypoxic in the emergency room he was in respiratory distress.  His CO2 was elevated and he was placed on BiPAP and admitted to the ICU.  In ICU he is extremely anxious and was started on Precedex drip which is greatly helping him tolerate the BiPAP.  CT ruled out PE and showed likely COPD.    Was weaned off oxygen, did well off BiPAP, and discharged on Prednisone taper, Doxycycline & albuterol.  Had been sick with URI prior to the respiratory distress developing.      reports that he quit smoking about 26 years ago. His smoking use included cigarettes. He started smoking about 20 years ago. He has a 20.2 pack-year smoking history. He quit smokeless tobacco use about 23 years ago.  His smokeless tobacco use included chew.    PHYSICAL EXAM:  Blood pressure 126/76, pulse 84, resp. rate 12, height 1.727 m (5' 8\"), weight 71.8 kg (158 lb 3.2 oz), SpO2 95 %.' on RA  154# Jan 2024;   Constitutional:  No acute distress.  Pleasant.   HENT:  Oropharynx is clear and moist.   Eyes:  Pupils

## 2024-04-02 NOTE — PATIENT INSTRUCTIONS
Compare programs for Breztri, Trelegy and Stiolto.  All are good choices.   [employer] [facility] [employer] in particular has a separate program where if you've paid so much out of pocket for all medications combined in one year, they then cover subsequent costs.

## 2024-04-04 ENCOUNTER — TELEPHONE (OUTPATIENT)
Dept: PULMONOLOGY | Age: 74
End: 2024-04-04

## 2024-04-04 NOTE — TELEPHONE ENCOUNTER
Pt stopped by the office and dropped off [employer] Patient Assistance Forms. Forms have been scanned into media and placed in Dr. Whitaker's fax folder.

## 2024-04-29 ENCOUNTER — TELEPHONE (OUTPATIENT)
Dept: PULMONOLOGY | Age: 74
End: 2024-04-29

## 2024-04-29 DIAGNOSIS — J44.9 CHRONIC OBSTRUCTIVE PULMONARY DISEASE, UNSPECIFIED COPD TYPE (HCC): Primary | ICD-10-CM

## 2024-04-29 DIAGNOSIS — J43.2 CENTRILOBULAR EMPHYSEMA (HCC): ICD-10-CM

## 2024-04-29 RX ORDER — FLUTICASONE FUROATE, UMECLIDINIUM BROMIDE AND VILANTEROL TRIFENATATE 100; 62.5; 25 UG/1; UG/1; UG/1
1 POWDER RESPIRATORY (INHALATION) DAILY
Qty: 3 EACH | Refills: 3 | Status: SHIPPED | OUTPATIENT
Start: 2024-04-29

## 2024-04-29 NOTE — TELEPHONE ENCOUNTER
Pt spouse called in stating that pt needs a written script for trelegy to be faxed in with paperwork. Rx pended     Will fax asap after signature on rx

## 2024-04-29 NOTE — TELEPHONE ENCOUNTER
Called pt to inform that the appt with Whitaker has been changed to 5/24/24 at 10. LVM on mobile number, called home number and spoke to pt to give him the appt change. Pt verbalized understanding of this change

## 2024-05-01 RX ORDER — ALBUTEROL SULFATE 90 UG/1
2 AEROSOL, METERED RESPIRATORY (INHALATION) EVERY 4 HOURS PRN
Qty: 8.5 G | Refills: 5 | Status: SHIPPED | OUTPATIENT
Start: 2024-05-01

## 2024-05-01 NOTE — TELEPHONE ENCOUNTER
I did send in albuterol with 5 refills.  Of note, per Dr. Whitaker's recent office visit plan, future follow up will be with with his PCP and there are no current plans for pt to f/u with pulm unless he is referred back, so future refills will likely come from PCP.

## 2024-05-01 NOTE — TELEPHONE ENCOUNTER
Pt would like a fill on his albuterol. Previously filled by [facility] but it would be easier for the patient and his wife if all of his medications are filled by the same provider.

## 2025-03-08 DIAGNOSIS — J44.9 CHRONIC OBSTRUCTIVE PULMONARY DISEASE, UNSPECIFIED COPD TYPE (HCC): ICD-10-CM

## 2025-03-10 RX ORDER — ALBUTEROL SULFATE 0.83 MG/ML
SOLUTION RESPIRATORY (INHALATION)
Qty: 300 ML | Refills: 7 | Status: SHIPPED | OUTPATIENT
Start: 2025-03-10

## 2025-03-17 ENCOUNTER — TELEPHONE (OUTPATIENT)
Dept: INTERNAL MEDICINE CLINIC | Age: 75
End: 2025-03-17

## 2025-03-17 RX ORDER — FLUTICASONE FUROATE, UMECLIDINIUM BROMIDE AND VILANTEROL TRIFENATATE 100; 62.5; 25 UG/1; UG/1; UG/1
1 POWDER RESPIRATORY (INHALATION) DAILY
Qty: 3 EACH | Refills: 0 | Status: SHIPPED | OUTPATIENT
Start: 2025-03-17 | End: 2025-03-20 | Stop reason: SDUPTHER

## 2025-03-17 RX ORDER — FLUTICASONE PROPIONATE AND SALMETEROL 250; 50 UG/1; UG/1
1 POWDER RESPIRATORY (INHALATION) EVERY 12 HOURS
Qty: 60 EACH | Refills: 2 | Status: SHIPPED | OUTPATIENT
Start: 2025-03-17 | End: 2025-07-15

## 2025-03-17 NOTE — TELEPHONE ENCOUNTER
----- Message from Dr. Fern Lechuga MD sent at 3/17/2025 11:42 AM EDT -----  Contact: MANOLO [phone]  Ok to fill  ----- Message -----  From: Kedar Villalobos  Sent: 3/17/2025  10:38 AM EDT  To: Fern Lechuga MD    Caller requesting below scripts are sent to the below pharmacy from historical provider, if unable caller asking for suggestions since  is no longer with pulmonology.     fluticasone-umeclidin-vilant (TRELEGY ELLIPTA) 100-62.5-25 MCG/ACT AEPB inhaler    fluticasone-salmeterol (ADVAIR DISKUS) 250-50 MCG/ACT AEPB diskus inhaler    [facility]/pharmacy #[id number] - [place], [place] - [address] [phone] - F [phone]  [address] [address]  Phone: [phone]  Fax: [phone]

## 2025-03-20 RX ORDER — FLUTICASONE FUROATE, UMECLIDINIUM BROMIDE AND VILANTEROL TRIFENATATE 100; 62.5; 25 UG/1; UG/1; UG/1
1 POWDER RESPIRATORY (INHALATION) DAILY
Qty: 3 EACH | Refills: 1 | Status: SHIPPED | OUTPATIENT
Start: 2025-03-20

## 2025-03-21 DIAGNOSIS — J44.9 CHRONIC OBSTRUCTIVE PULMONARY DISEASE, UNSPECIFIED COPD TYPE (HCC): Primary | ICD-10-CM

## 2025-03-22 RX ORDER — ALBUTEROL SULFATE 90 UG/1
2 INHALANT RESPIRATORY (INHALATION) EVERY 4 HOURS PRN
Qty: 8.5 G | Refills: 5 | Status: SHIPPED | OUTPATIENT
Start: 2025-03-22

## 2025-07-27 SDOH — ECONOMIC STABILITY: FOOD INSECURITY: WITHIN THE PAST 12 MONTHS, THE FOOD YOU BOUGHT JUST DIDN'T LAST AND YOU DIDN'T HAVE MONEY TO GET MORE.: NEVER TRUE

## 2025-07-27 SDOH — ECONOMIC STABILITY: FOOD INSECURITY: WITHIN THE PAST 12 MONTHS, YOU WORRIED THAT YOUR FOOD WOULD RUN OUT BEFORE YOU GOT MONEY TO BUY MORE.: NEVER TRUE

## 2025-07-27 SDOH — ECONOMIC STABILITY: TRANSPORTATION INSECURITY
IN THE PAST 12 MONTHS, HAS LACK OF TRANSPORTATION KEPT YOU FROM MEETINGS, WORK, OR FROM GETTING THINGS NEEDED FOR DAILY LIVING?: NO

## 2025-07-27 SDOH — ECONOMIC STABILITY: INCOME INSECURITY: IN THE LAST 12 MONTHS, WAS THERE A TIME WHEN YOU WERE NOT ABLE TO PAY THE MORTGAGE OR RENT ON TIME?: NO

## 2025-07-27 SDOH — ECONOMIC STABILITY: TRANSPORTATION INSECURITY
IN THE PAST 12 MONTHS, HAS THE LACK OF TRANSPORTATION KEPT YOU FROM MEDICAL APPOINTMENTS OR FROM GETTING MEDICATIONS?: NO

## 2025-07-27 ASSESSMENT — PATIENT HEALTH QUESTIONNAIRE - PHQ9
1. LITTLE INTEREST OR PLEASURE IN DOING THINGS: SEVERAL DAYS
SUM OF ALL RESPONSES TO PHQ9 QUESTIONS 1 & 2: 1
2. FEELING DOWN, DEPRESSED OR HOPELESS: NOT AT ALL
1. LITTLE INTEREST OR PLEASURE IN DOING THINGS: SEVERAL DAYS
SUM OF ALL RESPONSES TO PHQ QUESTIONS 1-9: 1
2. FEELING DOWN, DEPRESSED OR HOPELESS: NOT AT ALL
SUM OF ALL RESPONSES TO PHQ QUESTIONS 1-9: 1

## 2025-07-28 ENCOUNTER — OFFICE VISIT (OUTPATIENT)
Dept: INTERNAL MEDICINE CLINIC | Age: 75
End: 2025-07-28

## 2025-07-28 VITALS
BODY MASS INDEX: 25.01 KG/M2 | HEIGHT: 68 IN | WEIGHT: 165 LBS | RESPIRATION RATE: 12 BRPM | HEART RATE: 70 BPM | SYSTOLIC BLOOD PRESSURE: 138 MMHG | DIASTOLIC BLOOD PRESSURE: 80 MMHG

## 2025-07-28 DIAGNOSIS — E78.2 MIXED HYPERLIPIDEMIA: ICD-10-CM

## 2025-07-28 DIAGNOSIS — J43.2 CENTRILOBULAR EMPHYSEMA (HCC): Primary | Chronic | ICD-10-CM

## 2025-07-28 PROCEDURE — 99213 OFFICE O/P EST LOW 20 MIN: CPT | Performed by: INTERNAL MEDICINE

## 2025-07-28 PROCEDURE — 1036F TOBACCO NON-USER: CPT | Performed by: INTERNAL MEDICINE

## 2025-07-28 PROCEDURE — 1160F RVW MEDS BY RX/DR IN RCRD: CPT | Performed by: INTERNAL MEDICINE

## 2025-07-28 PROCEDURE — G8427 DOCREV CUR MEDS BY ELIG CLIN: HCPCS | Performed by: INTERNAL MEDICINE

## 2025-07-28 PROCEDURE — 90677 PCV20 VACCINE IM: CPT | Performed by: INTERNAL MEDICINE

## 2025-07-28 PROCEDURE — 1123F ACP DISCUSS/DSCN MKR DOCD: CPT | Performed by: INTERNAL MEDICINE

## 2025-07-28 PROCEDURE — 3017F COLORECTAL CA SCREEN DOC REV: CPT | Performed by: INTERNAL MEDICINE

## 2025-07-28 PROCEDURE — G8419 CALC BMI OUT NRM PARAM NOF/U: HCPCS | Performed by: INTERNAL MEDICINE

## 2025-07-28 PROCEDURE — 1159F MED LIST DOCD IN RCRD: CPT | Performed by: INTERNAL MEDICINE

## 2025-07-28 PROCEDURE — G0009 ADMIN PNEUMOCOCCAL VACCINE: HCPCS | Performed by: INTERNAL MEDICINE

## 2025-07-28 PROCEDURE — 3023F SPIROM DOC REV: CPT | Performed by: INTERNAL MEDICINE

## 2025-07-28 RX ORDER — FLUTICASONE PROPIONATE AND SALMETEROL 250; 50 UG/1; UG/1
1 POWDER RESPIRATORY (INHALATION) EVERY 12 HOURS
Qty: 60 EACH | Refills: 2 | Status: CANCELLED | OUTPATIENT
Start: 2025-07-28 | End: 2025-11-25

## 2025-07-28 RX ORDER — FLUTICASONE FUROATE, UMECLIDINIUM BROMIDE AND VILANTEROL TRIFENATATE 100; 62.5; 25 UG/1; UG/1; UG/1
1 POWDER RESPIRATORY (INHALATION) DAILY
Qty: 3 EACH | Refills: 1 | Status: SHIPPED | OUTPATIENT
Start: 2025-07-28

## 2025-07-28 ASSESSMENT — ENCOUNTER SYMPTOMS
ABDOMINAL PAIN: 0
DIARRHEA: 0
NAUSEA: 0
SHORTNESS OF BREATH: 1

## 2025-07-28 NOTE — PROGRESS NOTES
Chief Complaint:   Urban Godoy is a 74 y.o. male who presents for   Chief Complaint   Patient presents with    Follow-up       HPI    He is here for a follow up.    COPD  -F/w Dr. Whitaker.   -on Trelegy, Spiriva  -albuterol prn  -having PFT's and CT scan.     Admitted to [facility] 12/23-12/27/23 for COPD exacerbation  Initially in ICU on BiPAP.     He is doing well. No flare up of COPD    He has never had a colonoscopy.    His BP is normal at home.         Review of Systems  Review of Systems   Respiratory:  Positive for shortness of breath (at baseline; improved).    Cardiovascular:  Negative for chest pain.   Gastrointestinal:  Negative for abdominal pain, diarrhea and nausea.   Genitourinary:  Negative for difficulty urinating.         Allergies  Patient has no known allergies.      Vitals  /80 (BP Site: Right Upper Arm, Patient Position: Supine, BP Cuff Size: Medium Adult)   Pulse 70   Resp 12   Ht 1.727 m (5' 8\")   Wt 74.8 kg (165 lb)   BMI 25.09 kg/m²     Current Medications  Current Outpatient Medications   Medication Sig Dispense Refill    fluticasone-umeclidin-vilant (TRELEGY ELLIPTA) 100-62.5-25 MCG/ACT AEPB inhaler Inhale 1 puff into the lungs daily 3 each 1    fluticasone-salmeterol (ADVAIR DISKUS) 250-50 MCG/ACT AEPB diskus inhaler Inhale 1 puff into the lungs in the morning and 1 puff in the evening. Rinse mouth after use.. 60 each 2    albuterol (PROVENTIL) (2.5 MG/3ML) 0.083% nebulizer solution USE 3ML VIA NABULIZATION EVERY 4 HOURS AS NEEDED FOR FOR SHORTNESS OF BREATH OR WHEEZING. 300 mL 7    Spacer/Aero-Holding Chambers SOO 1 Device by Does not apply route daily 1 each 0     No current facility-administered medications for this visit.       Past Medical History  Past Medical History:   Diagnosis Date    Hypertension     RSD (reflex sympathetic dystrophy)        Social History  Social History     Socioeconomic History    Marital status:      Spouse name: Not on file    Number of

## 2025-07-29 LAB
ALBUMIN SERPL-MCNC: 4.2 G/DL (ref 3.4–5)
ALBUMIN/GLOB SERPL: 1.8 {RATIO} (ref 1.1–2.2)
ALP SERPL-CCNC: 43 U/L (ref 40–129)
ALT SERPL-CCNC: 13 U/L (ref 10–40)
ANION GAP SERPL CALCULATED.3IONS-SCNC: 12 MMOL/L (ref 3–16)
AST SERPL-CCNC: 17 U/L (ref 15–37)
BASOPHILS # BLD: 0 K/UL (ref 0–0.2)
BASOPHILS NFR BLD: 0.7 %
BILIRUB SERPL-MCNC: 0.3 MG/DL (ref 0–1)
BUN SERPL-MCNC: 14 MG/DL (ref 7–20)
CALCIUM SERPL-MCNC: 9.6 MG/DL (ref 8.3–10.6)
CHLORIDE SERPL-SCNC: 100 MMOL/L (ref 99–110)
CHOLEST SERPL-MCNC: 211 MG/DL (ref 0–199)
CO2 SERPL-SCNC: 24 MMOL/L (ref 21–32)
CREAT SERPL-MCNC: 1.3 MG/DL (ref 0.8–1.3)
DEPRECATED RDW RBC AUTO: 14.1 % (ref 12.4–15.4)
EOSINOPHIL # BLD: 0.2 K/UL (ref 0–0.6)
EOSINOPHIL NFR BLD: 2.6 %
GFR SERPLBLD CREATININE-BSD FMLA CKD-EPI: 58 ML/MIN/{1.73_M2}
GLUCOSE SERPL-MCNC: 98 MG/DL (ref 70–99)
HCT VFR BLD AUTO: 40.3 % (ref 40.5–52.5)
HDLC SERPL-MCNC: 67 MG/DL (ref 40–60)
HGB BLD-MCNC: 13.8 G/DL (ref 13.5–17.5)
LDLC SERPL CALC-MCNC: 103 MG/DL
LYMPHOCYTES # BLD: 1.8 K/UL (ref 1–5.1)
LYMPHOCYTES NFR BLD: 26.5 %
MCH RBC QN AUTO: 31.1 PG (ref 26–34)
MCHC RBC AUTO-ENTMCNC: 34.2 G/DL (ref 31–36)
MCV RBC AUTO: 90.9 FL (ref 80–100)
MONOCYTES # BLD: 0.6 K/UL (ref 0–1.3)
MONOCYTES NFR BLD: 9.2 %
NEUTROPHILS # BLD: 4.2 K/UL (ref 1.7–7.7)
NEUTROPHILS NFR BLD: 61 %
PLATELET # BLD AUTO: 303 K/UL (ref 135–450)
PMV BLD AUTO: 8 FL (ref 5–10.5)
POTASSIUM SERPL-SCNC: 4.6 MMOL/L (ref 3.5–5.1)
PROT SERPL-MCNC: 6.5 G/DL (ref 6.4–8.2)
RBC # BLD AUTO: 4.44 M/UL (ref 4.2–5.9)
SODIUM SERPL-SCNC: 136 MMOL/L (ref 136–145)
TRIGL SERPL-MCNC: 207 MG/DL (ref 0–150)
TSH SERPL DL<=0.005 MIU/L-ACNC: 2.73 UIU/ML (ref 0.27–4.2)
URATE SERPL-MCNC: 5.6 MG/DL (ref 3.5–7.2)
VLDLC SERPL CALC-MCNC: 41 MG/DL
WBC # BLD AUTO: 6.9 K/UL (ref 4–11)

## 2025-07-30 DIAGNOSIS — J44.9 CHRONIC OBSTRUCTIVE PULMONARY DISEASE, UNSPECIFIED COPD TYPE (HCC): Primary | ICD-10-CM

## 2025-07-30 RX ORDER — ATORVASTATIN CALCIUM 10 MG/1
10 TABLET, FILM COATED ORAL DAILY
Qty: 30 TABLET | Refills: 1 | Status: SHIPPED | OUTPATIENT
Start: 2025-07-30

## 2025-07-30 NOTE — TELEPHONE ENCOUNTER
Cholesterol high   Start Lipitor 10 mg po daily #30 1 refill and check liver and lipids on six weeks

## 2025-07-31 RX ORDER — ALBUTEROL SULFATE 90 UG/1
INHALANT RESPIRATORY (INHALATION)
Qty: 8.5 EACH | Refills: 5 | Status: SHIPPED | OUTPATIENT
Start: 2025-07-31